# Patient Record
Sex: MALE | Race: WHITE | Employment: FULL TIME | URBAN - METROPOLITAN AREA
[De-identification: names, ages, dates, MRNs, and addresses within clinical notes are randomized per-mention and may not be internally consistent; named-entity substitution may affect disease eponyms.]

---

## 2018-07-11 ENCOUNTER — CLINICAL DOCUMENTATION (OUTPATIENT)
Dept: CARDIOLOGY | Age: 48
End: 2018-07-11

## 2018-07-11 PROBLEM — I25.9 CHEST PAIN DUE TO MYOCARDIAL ISCHEMIA: Status: ACTIVE | Noted: 2018-07-11

## 2018-07-12 PROBLEM — R07.89 ATYPICAL CHEST PAIN: Status: ACTIVE | Noted: 2018-07-11

## 2018-12-12 ENCOUNTER — OFFICE VISIT (OUTPATIENT)
Dept: CARDIOLOGY CLINIC | Age: 48
End: 2018-12-12
Payer: COMMERCIAL

## 2018-12-12 ENCOUNTER — ANESTHESIA EVENT (OUTPATIENT)
Dept: CARDIAC CATH/INVASIVE PROCEDURES | Age: 48
End: 2018-12-12
Payer: COMMERCIAL

## 2018-12-12 VITALS
HEART RATE: 120 BPM | WEIGHT: 225 LBS | BODY MASS INDEX: 32.21 KG/M2 | DIASTOLIC BLOOD PRESSURE: 76 MMHG | HEIGHT: 70 IN | SYSTOLIC BLOOD PRESSURE: 96 MMHG

## 2018-12-12 DIAGNOSIS — I47.1 ATRIAL TACHYCARDIA (HCC): Primary | ICD-10-CM

## 2018-12-12 PROCEDURE — 4004F PT TOBACCO SCREEN RCVD TLK: CPT | Performed by: INTERNAL MEDICINE

## 2018-12-12 PROCEDURE — 99214 OFFICE O/P EST MOD 30 MIN: CPT | Performed by: INTERNAL MEDICINE

## 2018-12-12 PROCEDURE — G8427 DOCREV CUR MEDS BY ELIG CLIN: HCPCS | Performed by: INTERNAL MEDICINE

## 2018-12-12 PROCEDURE — 3017F COLORECTAL CA SCREEN DOC REV: CPT | Performed by: INTERNAL MEDICINE

## 2018-12-12 PROCEDURE — G8417 CALC BMI ABV UP PARAM F/U: HCPCS | Performed by: INTERNAL MEDICINE

## 2018-12-12 PROCEDURE — G8484 FLU IMMUNIZE NO ADMIN: HCPCS | Performed by: INTERNAL MEDICINE

## 2018-12-12 RX ORDER — METOPROLOL SUCCINATE 25 MG/1
25 TABLET, EXTENDED RELEASE ORAL DAILY
Qty: 30 TABLET | Refills: 3 | Status: SHIPPED | OUTPATIENT
Start: 2018-12-12 | End: 2019-01-09

## 2018-12-12 NOTE — LETTER
called an IV. But you may get medicines to take by mouth. You may feel a quick sting or pinch when the IV starts. The procedure usually takes about 4 to 8 hours. Transesophageal Echocardiogram  What is a transesophageal echocardiogram?  A transesophageal echocardiogram is a test to help your doctor look at the inside of your heart. A small device called a transducer directs sound waves toward your heart. The sound waves make a picture of the heart's valves and chambers. Your doctor may do this test to look for certain types of heart disease. Or it may be done to see how disease is affecting your heart. You will be given medicine to make you sleepy and comfortable during the test. The doctor puts a small, flexible tube into your throat and guides it to the esophagus. This is the tube that connects your mouth to your stomach. The doctor will ask you to swallow as the tube goes down. The transducer is at the tip of the tube. It gets close to your heart to make clear pictures. The doctor will look at the ultrasound pictures on a screen. You will not be able to eat or drink until the numbness from the throat spray wears off. Your throat may be sore for a few days after the test.  Follow-up care is a key part of your treatment and safety. Be sure to make and go to all appointments, and call your doctor if you are having problems. It's also a good idea to know your test results and keep a list of the medicines you take. [address]/CARDIOLOGY        Patient Name: Derick Rivera   : 1970   MRN# [id number]    Transesophageal Echocardiogram with Cardioversion    Day of Procedure Cath Lab Holding Area Preop Orders:    ? YOU HAVE MY PERMISSION TO TALK TO THE PATIENT-PLEASE    ? Anesthesia    ? CARMENZA with Anesthesia    ? IV peripheral saline lock  (preferred left arm).     ? STAT LABS ON ARRIVAL: BMP, MAG, PHOS, CBC, PT INR, PTT and/or other life saving measures, if I have a cardiac or respiratory arrest.    ___ I WANT to keep my DNR in effect during my procedure(s) and immediate post-operative recovery period through Phase 2 recovery. (Complete separate refusal form)     This form has been fully explained to me. I understand its contents. Patients Signature: ___________________________Date: ________  Time: ________    If patient unable to sign, has engaged the [address] [place] [address], is a minor, or has a court-appointed Guardian:  [facility] Representative Name (Print):  ____________________________________      Relationship ([place] one):    Guardian   Parent    Spouse    HCPOA   Child   Sibling  Next-of-Kin Friend    Patients Representative Signature: _______________________________________              Date: ______________  Time: __________    An  was used.  name/ID: _________________________________      [facility] ([facility]) Witness________________________  Date: ________   Time: _________    Physician/Practitioner _______________________  Date: ________   Time: _________           Revision 2017                                      Geovanna Pickering Alt     PROCEDURE TO SCHEDULE:    Transesophageal Echocardiogram with Cardioversion    Patient Name: Blake Tucker   : 1970   MRN# [id number]    Home Phone Number: [phone]   Weight:    Wt Readings from Last 3 Encounters:   18 225 lb (102.1 kg)   18 223 lb 6.4 oz (101.3 kg)   16 220 lb (99.8 kg)        Insurance: Payor: / No coverage found. Date of Procedure: 2018 Time: 11:30 am Arrival Time: 9:30 am    Diagnosis:  Atrial Flutter  Allergies:    Allergies   Allergen Reactions    Codeine Nausea Only    Pcn [Penicillins] Rash    Sulfa Antibiotics Rash          1) Call [facility] scheduling ([phone]) or [address]     PHONE OR   INSTANT MESSAGE

## 2018-12-12 NOTE — ANESTHESIA PRE PROCEDURE
Department of Anesthesiology  Preprocedure Note       Name:  Luis Taveras   Age:  46 y.o.  :  1970                                          MRN:  [id number]         Date:  2018      Surgeon: * Surgery not found *    Procedure: CL CARDIOVERSION    Medications prior to admission:   Prior to Admission medications    Medication Sig Start Date End Date Taking? Authorizing Provider   Multiple Vitamins-Minerals (MULTIVITAMIN ADULT PO) Take by mouth    Historical Provider, MD   metoprolol succinate (TOPROL XL) 25 MG extended release tablet Take 1 tablet by mouth daily 18   Keenan Schlatter, MD   rivaroxaban (XARELTO) 20 MG TABS tablet Take 1 tablet by mouth daily (with breakfast) 18   Fred Carlos Montanez MD       Current medications:    Current Outpatient Prescriptions   Medication Sig Dispense Refill    Multiple Vitamins-Minerals (MULTIVITAMIN ADULT PO) Take by mouth      metoprolol succinate (TOPROL XL) 25 MG extended release tablet Take 1 tablet by mouth daily 30 tablet 3    rivaroxaban (XARELTO) 20 MG TABS tablet Take 1 tablet by mouth daily (with breakfast) 30 tablet 2     No current facility-administered medications for this encounter. Allergies:     Allergies   Allergen Reactions    Codeine Nausea Only    Pcn [Penicillins] Rash    Sulfa Antibiotics Rash       Problem List:    Patient Active Problem List   Diagnosis Code    Atrial fibrillation with rapid ventricular response (HCC) I48.91    Chest discomfort R07.89    Chronic tension headaches G44.229    Nausea & vomiting R11.2    LLQ abdominal pain R10.32    Diarrhea R19.7    Atrial fibrillation (HCC) I48.91    S/P ablation of atrial fibrillation Z98.890, Z86.79    S/P ablation of atrial flutter Z98.890, Z86.79    Atypical chest pain R07.89       Past Medical History:        Diagnosis Date    Arrhythmia 2014    Atrial fib    Arthritis     right shoulder    H/O cardiovascular stress test 2014

## 2018-12-12 NOTE — PROGRESS NOTES
CARDIOLOGY NOTE      12/12/2018    RE: Vitaly Fields  (1970)                               TO:  Dr. Nicolette Tolliver DO            Melania Msoer is a 46 y.o. male who was seen today for management of  tachycardia                                    HPI:   Patient is here for    - arrythmia                The patient {HAS/DOES NOT HAVE:20504} cardiac complaints  Was at PCP was noted to be tachy with ? ST changes    Vitaly Fields has the following history recorded in care path:  Patient Active Problem List    Diagnosis Date Noted    Atrial fibrillation with rapid ventricular response ([facility] [address] 11/14/2014     Priority: High    Atypical chest pain 07/11/2018     Priority: Low    S/P ablation of atrial fibrillation      Priority: Low    S/P ablation of atrial flutter      Priority: Low    Atrial fibrillation (HCC)      Priority: Low    Chest discomfort 11/14/2014     Priority: Low    Chronic tension headaches 11/14/2014     Priority: Low    Nausea & vomiting 11/14/2014     Priority: Low    LLQ abdominal pain 11/14/2014     Priority: Low    Diarrhea 11/14/2014     Priority: Low     Current Outpatient Prescriptions   Medication Sig Dispense Refill    pantoprazole (PROTONIX) 40 MG tablet Take 1 tablet by mouth every morning (before breakfast) for 14 days 14 tablet 0    nicotine (NICODERM CQ) 14 MG/24HR Place 1 patch onto the skin daily 30 patch 0    melatonin 3 MG TABS tablet Take 3 mg by mouth daily as needed       No current facility-administered medications for this visit. Allergies: Codeine; Pcn [penicillins]; and Sulfa antibiotics  Past Medical History:   Diagnosis Date    Arrhythmia 11/2014    Atrial fib    Arthritis     right shoulder    H/O cardiovascular stress test 12/12/2014    cardiolite-normal, no ischemia    H/O echocardiogram 4/13/15    EF 50-55% Mildly dilated left atrium. Borderline sized right ventricle. Trace MR & TR.      Past Surgical History:   Procedure

## 2018-12-13 ENCOUNTER — HOSPITAL ENCOUNTER (OUTPATIENT)
Dept: CARDIAC CATH/INVASIVE PROCEDURES | Age: 48
Discharge: HOME | End: 2018-12-13
Attending: INTERNAL MEDICINE | Admitting: INTERNAL MEDICINE
Payer: COMMERCIAL

## 2018-12-13 ENCOUNTER — ANESTHESIA (OUTPATIENT)
Dept: CARDIAC CATH/INVASIVE PROCEDURES | Age: 48
End: 2018-12-13
Payer: COMMERCIAL

## 2018-12-13 VITALS
SYSTOLIC BLOOD PRESSURE: 104 MMHG | TEMPERATURE: 97.8 F | HEART RATE: 77 BPM | WEIGHT: 225 LBS | BODY MASS INDEX: 32.21 KG/M2 | RESPIRATION RATE: 20 BRPM | DIASTOLIC BLOOD PRESSURE: 78 MMHG | OXYGEN SATURATION: 99 % | HEIGHT: 70 IN

## 2018-12-13 VITALS — DIASTOLIC BLOOD PRESSURE: 81 MMHG | SYSTOLIC BLOOD PRESSURE: 120 MMHG | OXYGEN SATURATION: 99 %

## 2018-12-13 LAB
ANION GAP SERPL CALCULATED.3IONS-SCNC: 10 MMOL/L (ref 4–16)
APTT: 44.9 SECONDS (ref 21.2–33)
BUN BLDV-MCNC: 18 MG/DL (ref 6–23)
CALCIUM SERPL-MCNC: 9.2 MG/DL (ref 8.3–10.6)
CHLORIDE BLD-SCNC: 105 MMOL/L (ref 99–110)
CO2: 25 MMOL/L (ref 21–32)
CREAT SERPL-MCNC: 0.8 MG/DL (ref 0.9–1.3)
EKG ATRIAL RATE: 256 BPM
EKG ATRIAL RATE: 76 BPM
EKG DIAGNOSIS: NORMAL
EKG DIAGNOSIS: NORMAL
EKG P AXIS: 62 DEGREES
EKG P-R INTERVAL: 172 MS
EKG Q-T INTERVAL: 340 MS
EKG Q-T INTERVAL: 388 MS
EKG QRS DURATION: 96 MS
EKG QRS DURATION: 98 MS
EKG QTC CALCULATION (BAZETT): 436 MS
EKG QTC CALCULATION (BAZETT): 476 MS
EKG R AXIS: -16 DEGREES
EKG R AXIS: -30 DEGREES
EKG T AXIS: 0 DEGREES
EKG T AXIS: 4 DEGREES
EKG VENTRICULAR RATE: 118 BPM
EKG VENTRICULAR RATE: 76 BPM
GFR AFRICAN AMERICAN: >60 ML/MIN/1.73M2
GFR NON-AFRICAN AMERICAN: >60 ML/MIN/1.73M2
GLUCOSE BLD-MCNC: 113 MG/DL (ref 70–99)
HCT VFR BLD CALC: 47.7 % (ref 42–52)
HEMOGLOBIN: 15.9 GM/DL (ref 13.5–18)
INR BLD: 1.78 INDEX
MAGNESIUM: 1.8 MG/DL (ref 1.8–2.4)
MCH RBC QN AUTO: 29.9 PG (ref 27–31)
MCHC RBC AUTO-ENTMCNC: 33.3 % (ref 32–36)
MCV RBC AUTO: 89.7 FL (ref 78–100)
PDW BLD-RTO: 12 % (ref 11.7–14.9)
PHOSPHORUS: 2.9 MG/DL (ref 2.5–4.9)
PLATELET # BLD: 254 K/CU MM (ref 140–440)
PMV BLD AUTO: 9.5 FL (ref 7.5–11.1)
POTASSIUM SERPL-SCNC: 5 MMOL/L (ref 3.5–5.1)
PROTHROMBIN TIME: 20.5 SECONDS (ref 9.12–12.5)
RBC # BLD: 5.32 M/CU MM (ref 4.6–6.2)
SODIUM BLD-SCNC: 140 MMOL/L (ref 135–145)
WBC # BLD: 6.7 K/CU MM (ref 4–10.5)

## 2018-12-13 PROCEDURE — 93005 ELECTROCARDIOGRAM TRACING: CPT

## 2018-12-13 PROCEDURE — 3700000001 HC ADD 15 MINUTES (ANESTHESIA)

## 2018-12-13 PROCEDURE — 93325 DOPPLER ECHO COLOR FLOW MAPG: CPT | Performed by: INTERNAL MEDICINE

## 2018-12-13 PROCEDURE — 83735 ASSAY OF MAGNESIUM: CPT

## 2018-12-13 PROCEDURE — 84100 ASSAY OF PHOSPHORUS: CPT

## 2018-12-13 PROCEDURE — 2500000003 HC RX 250 WO HCPCS

## 2018-12-13 PROCEDURE — 3700000000 HC ANESTHESIA ATTENDED CARE

## 2018-12-13 PROCEDURE — 93312 ECHO TRANSESOPHAGEAL: CPT | Performed by: INTERNAL MEDICINE

## 2018-12-13 PROCEDURE — 93312 ECHO TRANSESOPHAGEAL: CPT

## 2018-12-13 PROCEDURE — 85610 PROTHROMBIN TIME: CPT

## 2018-12-13 PROCEDURE — 6360000002 HC RX W HCPCS: Performed by: NURSE ANESTHETIST, CERTIFIED REGISTERED

## 2018-12-13 PROCEDURE — 92960 CARDIOVERSION ELECTRIC EXT: CPT

## 2018-12-13 PROCEDURE — 2580000003 HC RX 258: Performed by: NURSE ANESTHETIST, CERTIFIED REGISTERED

## 2018-12-13 PROCEDURE — 6370000000 HC RX 637 (ALT 250 FOR IP): Performed by: INTERNAL MEDICINE

## 2018-12-13 PROCEDURE — 6360000002 HC RX W HCPCS

## 2018-12-13 PROCEDURE — 2709999900 HC NON-CHARGEABLE SUPPLY

## 2018-12-13 PROCEDURE — 2500000003 HC RX 250 WO HCPCS: Performed by: NURSE ANESTHETIST, CERTIFIED REGISTERED

## 2018-12-13 PROCEDURE — 85730 THROMBOPLASTIN TIME PARTIAL: CPT

## 2018-12-13 PROCEDURE — 85027 COMPLETE CBC AUTOMATED: CPT

## 2018-12-13 PROCEDURE — 80048 BASIC METABOLIC PNL TOTAL CA: CPT

## 2018-12-13 PROCEDURE — 92960 CARDIOVERSION ELECTRIC EXT: CPT | Performed by: INTERNAL MEDICINE

## 2018-12-13 RX ORDER — SODIUM CHLORIDE 9 MG/ML
INJECTION, SOLUTION INTRAVENOUS CONTINUOUS PRN
Status: DISCONTINUED | OUTPATIENT
Start: 2018-12-13 | End: 2018-12-13 | Stop reason: SDUPTHER

## 2018-12-13 RX ORDER — PROPOFOL 10 MG/ML
INJECTION, EMULSION INTRAVENOUS PRN
Status: DISCONTINUED | OUTPATIENT
Start: 2018-12-13 | End: 2018-12-13 | Stop reason: SDUPTHER

## 2018-12-13 RX ORDER — LIDOCAINE HYDROCHLORIDE 20 MG/ML
INJECTION, SOLUTION EPIDURAL; INFILTRATION; INTRACAUDAL; PERINEURAL PRN
Status: DISCONTINUED | OUTPATIENT
Start: 2018-12-13 | End: 2018-12-13 | Stop reason: SDUPTHER

## 2018-12-13 RX ADMIN — METOPROLOL TARTRATE 25 MG: 25 TABLET, FILM COATED ORAL at 12:45

## 2018-12-13 RX ADMIN — LIDOCAINE HYDROCHLORIDE 100 MG: 20 INJECTION, SOLUTION EPIDURAL; INFILTRATION; INTRACAUDAL; PERINEURAL at 11:49

## 2018-12-13 RX ADMIN — SODIUM CHLORIDE: 9 INJECTION, SOLUTION INTRAVENOUS at 11:19

## 2018-12-13 RX ADMIN — PROPOFOL 100 MG: 10 INJECTION, EMULSION INTRAVENOUS at 11:49

## 2018-12-13 ASSESSMENT — PULMONARY FUNCTION TESTS
PIF_VALUE: 1

## 2018-12-14 ENCOUNTER — TELEPHONE (OUTPATIENT)
Dept: CARDIOLOGY CLINIC | Age: 48
End: 2018-12-14

## 2018-12-17 ENCOUNTER — TELEPHONE (OUTPATIENT)
Dept: CARDIOLOGY CLINIC | Age: 48
End: 2018-12-17

## 2018-12-17 NOTE — LETTER
[facility]  [address] [place] [address]  Phone: [phone]  Fax: [phone]    Janelle Landry MD        December 17, 2018     Patient: Chris Hall   YOB: 1970   Date of Visit: 12/17/2018       To Whom It May Concern: It is my medical opinion that Yasmine Rosas may return to work on Monday, December 17, 2018. 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 We will re elevate at next appointment on December 26, 2018 @ 1:20 pm.    If you have any questions or concerns, please don't hesitate to call.     Sincerely,      Janelle Landry MD

## 2018-12-26 ENCOUNTER — OFFICE VISIT (OUTPATIENT)
Dept: CARDIOLOGY CLINIC | Age: 48
End: 2018-12-26
Payer: COMMERCIAL

## 2018-12-26 ENCOUNTER — HOSPITAL ENCOUNTER (OUTPATIENT)
Age: 48
Discharge: HOME | End: 2018-12-26
Payer: COMMERCIAL

## 2018-12-26 VITALS
DIASTOLIC BLOOD PRESSURE: 77 MMHG | SYSTOLIC BLOOD PRESSURE: 111 MMHG | HEART RATE: 88 BPM | RESPIRATION RATE: 16 BRPM | OXYGEN SATURATION: 100 %

## 2018-12-26 VITALS
HEIGHT: 70 IN | HEART RATE: 130 BPM | BODY MASS INDEX: 31.55 KG/M2 | WEIGHT: 220.4 LBS | SYSTOLIC BLOOD PRESSURE: 112 MMHG | DIASTOLIC BLOOD PRESSURE: 82 MMHG

## 2018-12-26 DIAGNOSIS — Z98.890 S/P ABLATION OF ATRIAL FLUTTER: ICD-10-CM

## 2018-12-26 DIAGNOSIS — I48.92 ATRIAL FLUTTER, UNSPECIFIED TYPE (HCC): ICD-10-CM

## 2018-12-26 DIAGNOSIS — Z86.79 S/P ABLATION OF ATRIAL FIBRILLATION: ICD-10-CM

## 2018-12-26 DIAGNOSIS — R07.9 CHEST PAIN, UNSPECIFIED TYPE: Primary | ICD-10-CM

## 2018-12-26 DIAGNOSIS — Z98.890 S/P ABLATION OF ATRIAL FIBRILLATION: ICD-10-CM

## 2018-12-26 DIAGNOSIS — Z86.79 S/P ABLATION OF ATRIAL FLUTTER: ICD-10-CM

## 2018-12-26 DIAGNOSIS — I48.91 ATRIAL FIBRILLATION WITH RAPID VENTRICULAR RESPONSE (HCC): ICD-10-CM

## 2018-12-26 LAB
EKG ATRIAL RATE: 80 BPM
EKG DIAGNOSIS: NORMAL
EKG P AXIS: 53 DEGREES
EKG P-R INTERVAL: 166 MS
EKG Q-T INTERVAL: 378 MS
EKG QRS DURATION: 90 MS
EKG QTC CALCULATION (BAZETT): 435 MS
EKG R AXIS: -42 DEGREES
EKG T AXIS: 1 DEGREES
EKG VENTRICULAR RATE: 80 BPM

## 2018-12-26 PROCEDURE — 93000 ELECTROCARDIOGRAM COMPLETE: CPT | Performed by: NURSE PRACTITIONER

## 2018-12-26 PROCEDURE — 92960 CARDIOVERSION ELECTRIC EXT: CPT | Performed by: INTERNAL MEDICINE

## 2018-12-26 PROCEDURE — 7100000000 HC PACU RECOVERY - FIRST 15 MIN

## 2018-12-26 PROCEDURE — 99213 OFFICE O/P EST LOW 20 MIN: CPT | Performed by: NURSE PRACTITIONER

## 2018-12-26 PROCEDURE — 7100000001 HC PACU RECOVERY - ADDTL 15 MIN

## 2018-12-26 PROCEDURE — 92960 CARDIOVERSION ELECTRIC EXT: CPT

## 2018-12-26 RX ORDER — AMIODARONE HYDROCHLORIDE 200 MG/1
200 TABLET ORAL 2 TIMES DAILY
Qty: 30 TABLET | Refills: 3 | Status: SHIPPED | OUTPATIENT
Start: 2018-12-26 | End: 2019-01-09 | Stop reason: SDUPTHER

## 2018-12-26 ASSESSMENT — ENCOUNTER SYMPTOMS
COUGH: 1
SORE THROAT: 0
EYES NEGATIVE: 1
GASTROINTESTINAL NEGATIVE: 1
ALLERGIC/IMMUNOLOGIC NEGATIVE: 1

## 2018-12-26 NOTE — LETTER
Radha Foster     Cardioversion    Patient Name: Guero Olivera   : 1970   MRN# [id number]     Date of Procedure: 18 Time: 3pm Arrival Time: 130pm     Hospital: [facility])     Call to [facility] at [phone] 2 days before your procedure    X Please do not have anything by mouth after midnight prior to or 8 hours before the procedure. X You may take your medication with a sip of water unless advised otherwise below. X Please continue to take Xarelto (rivaroxaban) as directed. Patient Signature:____________________________ Staff Signature: Maylin Payne  What is cardioversion? Cardioversion helps your heart return to a normal rhythm. It treats problems like atrial fibrillation. It is also sometimes used in emergencies. It can correct a fast heartbeat that causes low blood pressure, chest pain, or heart failure. Your doctor may ask you to take medicines before the treatment. These help prevent blood clots. Your doctor will watch you closely to make sure that there are no problems. Electrical cardioversion: The electrical procedure is done in a hospital. You will get medicine to help you relax and control the pain. Your doctor will put paddles or patches on your chest and back. These send an electric current to your heart. This resets your heart rhythm. The electrical part takes about 5 minutes. But you will probably be in the hospital for 1 to 2 hours. You will need to recover from the effects of the pain medicine. Chemical cardioversion: The chemical procedure is most often done in a hospital. In most cases, the medicine is put into your arm through a tube called an IV. But you may get medicines to take by mouth. You may feel a quick sting or pinch when the IV starts.  The procedure usually takes about An  was used.  name/ID: _________________________________      [facility] ([facility]) Witness________________________  Date: ________   Time: _________    Physician/Practitioner _______________________  Date: ________   Time: _________           Revision 2017        Geovanna Owens       PROCEDURE TO SCHEDULE:    Cardioversion    Patient Name: Blake Tucker   : 1970   MRN# [id number]    Home Phone Number: [phone]   Weight:    Wt Readings from Last 3 Encounters:   18 220 lb 6.4 oz (100 kg)   18 225 lb (102.1 kg)   18 225 lb (102.1 kg)        Insurance: Payor: Miracle Harmon / Plan: Suzanne Wall  / Product Type: *No Product type* /     Date of Procedure: 18 Time: 3pm Arrival Time: 130pm    Diagnosis:  Atrial flutter   Allergies:    Allergies   Allergen Reactions    Codeine Nausea Only    Pcn [Penicillins] Rash    Sulfa Antibiotics Rash          1) Call [facility] scheduling ([phone]) or Instant Message  CONFIRMED WITH     PHONE OR   INSTANT MESSAGE  2) PREAUTHORIZATION NUMBER:    Spoke to:      From date:     expiration date:          Javed Elliott

## 2018-12-26 NOTE — PROGRESS NOTES
Electrophysiology Follow up      Chief complaint :  F/o cardioversion of atrial flutter    Referring physician: Dr. Elena Bangura     Primary care physician: Jameel Blanchard. DO Michelle     History of Present Illness: 12/26/2018  He notes he stopped taking his b blocker on Monday. [place] it made him hot and had chest pain. He notes he is having palpitations as before his cardioversion. He notes that his breath is taken away when he lays down and changes from side to side. Note he is having episodes of feeling hot and cold flashes. There is some chest wall tenderness to palpation. Previous visit on (12/12/2018)           Chief Complaint   Patient presents with    Chest Pain        patient sent here from PCP visit this morning. Pt c/o chest pains, radiating into neck, shoulder and hip pain. Sob with activity, palpitations, migraines, dizziness and cold sweats. Episodes have happened at work while standing on his machine. Symptoms are getting worse after experiencing them for the past few months. Pt denies edema.  Shortness of Breath    Palpitations    Dizziness          Previous visit:2/10/2016)           Chief Complaint   Patient presents with    6 Month Follow-Up       Patient is here for 6 month OV, he denies any chest pain, palpitations, dizziness, SOB or edema. Pt wants to discuss some of his medications with you.       Over all he feels lot better  He reports arthralgia at times - question of medication induced  Metoprolol makes him tired for some time after he takes it.        Previous visit: (7/10/2015)             Chief Complaint   Patient presents with    Check-Up       2 month follow up. Denies dizziness, edema, and palpitations. He hasn't felt like he has been in A-Fib since having the ablation.  Had great energies and was able to work with out any problem till he has come down with this URI        Chest Congestion       Patient states that for the last 3 weeks he has been feeling a

## 2018-12-27 RX ORDER — MIDAZOLAM HYDROCHLORIDE 1 MG/ML
2 INJECTION INTRAMUSCULAR; INTRAVENOUS ONCE
Status: ACTIVE | OUTPATIENT
Start: 2018-12-26

## 2018-12-28 ENCOUNTER — TELEPHONE (OUTPATIENT)
Dept: CARDIOLOGY CLINIC | Age: 48
End: 2018-12-28

## 2018-12-28 NOTE — TELEPHONE ENCOUNTER
Patient here in office and educated on A flutter ablation , schedule for 1/15/19 @ 7:45, with arrival @ 5:45, @ [address]; risk explained; and consents signed. Also copy of orders given for labs and CXR due 1/11/19 at [facility]. Instruction given to patient to :  NPO after midnight the night before procedure; call hospital at [phone] to pre-register. May take rest of morning meds of procedure. Hold Xarelto the morning before procedure. Hold ALL blood pressure medications morning of procedure. Patient voiced understanding. Copies of consent & info scanned in chart.

## 2019-01-09 ENCOUNTER — OFFICE VISIT (OUTPATIENT)
Dept: CARDIOLOGY CLINIC | Age: 49
End: 2019-01-09
Payer: COMMERCIAL

## 2019-01-09 ENCOUNTER — TELEPHONE (OUTPATIENT)
Dept: CARDIOLOGY CLINIC | Age: 49
End: 2019-01-09

## 2019-01-09 VITALS
DIASTOLIC BLOOD PRESSURE: 98 MMHG | SYSTOLIC BLOOD PRESSURE: 130 MMHG | HEART RATE: 112 BPM | WEIGHT: 225.8 LBS | BODY MASS INDEX: 32.33 KG/M2 | HEIGHT: 70 IN

## 2019-01-09 DIAGNOSIS — I47.1 ATRIAL TACHYCARDIA (HCC): Primary | ICD-10-CM

## 2019-01-09 DIAGNOSIS — I48.91 ATRIAL FIBRILLATION, UNSPECIFIED TYPE (HCC): ICD-10-CM

## 2019-01-09 PROCEDURE — G8484 FLU IMMUNIZE NO ADMIN: HCPCS | Performed by: INTERNAL MEDICINE

## 2019-01-09 PROCEDURE — G8427 DOCREV CUR MEDS BY ELIG CLIN: HCPCS | Performed by: INTERNAL MEDICINE

## 2019-01-09 PROCEDURE — 3017F COLORECTAL CA SCREEN DOC REV: CPT | Performed by: INTERNAL MEDICINE

## 2019-01-09 PROCEDURE — 4004F PT TOBACCO SCREEN RCVD TLK: CPT | Performed by: INTERNAL MEDICINE

## 2019-01-09 PROCEDURE — 99213 OFFICE O/P EST LOW 20 MIN: CPT | Performed by: INTERNAL MEDICINE

## 2019-01-09 PROCEDURE — 93000 ELECTROCARDIOGRAM COMPLETE: CPT | Performed by: INTERNAL MEDICINE

## 2019-01-09 PROCEDURE — G8417 CALC BMI ABV UP PARAM F/U: HCPCS | Performed by: INTERNAL MEDICINE

## 2019-01-11 RX ORDER — AMIODARONE HYDROCHLORIDE 200 MG/1
200 TABLET ORAL DAILY
Qty: 30 TABLET | Refills: 3 | Status: SHIPPED | OUTPATIENT
Start: 2019-01-11 | End: 2019-05-22 | Stop reason: ALTCHOICE

## 2019-01-14 ENCOUNTER — HOSPITAL ENCOUNTER (OUTPATIENT)
Age: 49
Discharge: HOME | End: 2019-01-14
Payer: COMMERCIAL

## 2019-01-14 ENCOUNTER — HOSPITAL ENCOUNTER (OUTPATIENT)
Dept: GENERAL RADIOLOGY | Age: 49
Discharge: HOME | End: 2019-01-14
Payer: COMMERCIAL

## 2019-01-14 ENCOUNTER — ANESTHESIA EVENT (OUTPATIENT)
Dept: CARDIAC CATH/INVASIVE PROCEDURES | Age: 49
End: 2019-01-14
Payer: COMMERCIAL

## 2019-01-14 DIAGNOSIS — Z01.818 PRE-PROCEDURAL EXAMINATION: ICD-10-CM

## 2019-01-14 LAB
ANION GAP SERPL CALCULATED.3IONS-SCNC: 11 MMOL/L (ref 4–16)
APTT: 50.8 SECONDS (ref 21.2–33)
BUN BLDV-MCNC: 13 MG/DL (ref 6–23)
CALCIUM SERPL-MCNC: 9.1 MG/DL (ref 8.3–10.6)
CHLORIDE BLD-SCNC: 105 MMOL/L (ref 99–110)
CO2: 27 MMOL/L (ref 21–32)
CREAT SERPL-MCNC: 1.1 MG/DL (ref 0.9–1.3)
GFR AFRICAN AMERICAN: >60 ML/MIN/1.73M2
GFR NON-AFRICAN AMERICAN: >60 ML/MIN/1.73M2
GLUCOSE BLD-MCNC: 80 MG/DL (ref 70–99)
HCT VFR BLD CALC: 48.9 % (ref 42–52)
HEMOGLOBIN: 15.8 GM/DL (ref 13.5–18)
INR BLD: 1.76 INDEX
MAGNESIUM: 2.1 MG/DL (ref 1.8–2.4)
MCH RBC QN AUTO: 29.6 PG (ref 27–31)
MCHC RBC AUTO-ENTMCNC: 32.3 % (ref 32–36)
MCV RBC AUTO: 91.7 FL (ref 78–100)
PDW BLD-RTO: 12 % (ref 11.7–14.9)
PHOSPHORUS: 2.7 MG/DL (ref 2.5–4.9)
PLATELET # BLD: 291 K/CU MM (ref 140–440)
PMV BLD AUTO: 9.3 FL (ref 7.5–11.1)
POTASSIUM SERPL-SCNC: 4.8 MMOL/L (ref 3.5–5.1)
PROTHROMBIN TIME: 19.9 SECONDS (ref 9.12–12.5)
RBC # BLD: 5.33 M/CU MM (ref 4.6–6.2)
SODIUM BLD-SCNC: 143 MMOL/L (ref 135–145)
WBC # BLD: 7.1 K/CU MM (ref 4–10.5)

## 2019-01-14 PROCEDURE — 71046 X-RAY EXAM CHEST 2 VIEWS: CPT

## 2019-01-14 PROCEDURE — 36415 COLL VENOUS BLD VENIPUNCTURE: CPT

## 2019-01-14 PROCEDURE — 80048 BASIC METABOLIC PNL TOTAL CA: CPT

## 2019-01-14 PROCEDURE — 85730 THROMBOPLASTIN TIME PARTIAL: CPT

## 2019-01-14 PROCEDURE — 85610 PROTHROMBIN TIME: CPT

## 2019-01-14 PROCEDURE — 85027 COMPLETE CBC AUTOMATED: CPT

## 2019-01-14 PROCEDURE — 83735 ASSAY OF MAGNESIUM: CPT

## 2019-01-14 PROCEDURE — 84100 ASSAY OF PHOSPHORUS: CPT

## 2019-01-15 ENCOUNTER — APPOINTMENT (OUTPATIENT)
Dept: GENERAL RADIOLOGY | Age: 49
End: 2019-01-15
Attending: INTERNAL MEDICINE
Payer: COMMERCIAL

## 2019-01-15 ENCOUNTER — ANESTHESIA (OUTPATIENT)
Dept: CARDIAC CATH/INVASIVE PROCEDURES | Age: 49
End: 2019-01-15
Payer: COMMERCIAL

## 2019-01-15 ENCOUNTER — HOSPITAL ENCOUNTER (OUTPATIENT)
Dept: CARDIAC CATH/INVASIVE PROCEDURES | Age: 49
Setting detail: OBSERVATION
Discharge: HOME | End: 2019-01-16
Attending: INTERNAL MEDICINE | Admitting: INTERNAL MEDICINE
Payer: COMMERCIAL

## 2019-01-15 VITALS
DIASTOLIC BLOOD PRESSURE: 57 MMHG | TEMPERATURE: 97.6 F | OXYGEN SATURATION: 100 % | RESPIRATION RATE: 6 BRPM | SYSTOLIC BLOOD PRESSURE: 93 MMHG

## 2019-01-15 LAB
ACTIVATED CLOTTING TIME, LOW RANGE: 154 SEC
ACTIVATED CLOTTING TIME, LOW RANGE: 181 SEC
ACTIVATED CLOTTING TIME, LOW RANGE: 217 SEC
ACTIVATED CLOTTING TIME, LOW RANGE: 306 SEC
ACTIVATED CLOTTING TIME, LOW RANGE: 321 SEC
ACTIVATED CLOTTING TIME, LOW RANGE: 332 SEC
ACTIVATED CLOTTING TIME, LOW RANGE: 341 SEC
ACTIVATED CLOTTING TIME, LOW RANGE: 360 SEC
BASE EXCESS MIXED: 0.9 (ref 0–1.2)
BASE EXCESS: ABNORMAL (ref 0–3.3)
CO2: 27 MMOL/L (ref 21–32)
GLUCOSE BLD-MCNC: 124 MG/DL (ref 70–99)
HCO3 ARTERIAL: 25.4 MMOL/L (ref 18–23)
HCT VFR BLD CALC: 44 % (ref 42–52)
HEMOGLOBIN: 15.1 GM/DL (ref 13.5–18)
O2 SATURATION: 99.9 % (ref 96–97)
PCO2 ARTERIAL: 39.6 MMHG (ref 32–45)
PH BLOOD: 7.42 (ref 7.34–7.45)
PO2 ARTERIAL: 255.8 MMHG (ref 75–100)
POC CALCIUM: 1.14 MMOL/L (ref 1.12–1.32)
POC CHLORIDE: 111 MMOL/L (ref 98–109)
POC CREATININE: 0.7 MG/DL (ref 0.9–1.3)
POTASSIUM SERPL-SCNC: 4.6 MMOL/L (ref 3.5–4.5)
SODIUM BLD-SCNC: 143 MMOL/L (ref 138–146)
SOURCE, BLOOD GAS: ABNORMAL

## 2019-01-15 PROCEDURE — 93655 ICAR CATH ABLTJ DSCRT ARRHYT: CPT | Performed by: INTERNAL MEDICINE

## 2019-01-15 PROCEDURE — 93662 INTRACARDIAC ECG (ICE): CPT

## 2019-01-15 PROCEDURE — 2700000000 HC OXYGEN THERAPY PER DAY

## 2019-01-15 PROCEDURE — 2500000003 HC RX 250 WO HCPCS

## 2019-01-15 PROCEDURE — 93656 COMPRE EP EVAL ABLTJ ATR FIB: CPT

## 2019-01-15 PROCEDURE — 94761 N-INVAS EAR/PLS OXIMETRY MLT: CPT

## 2019-01-15 PROCEDURE — 6360000002 HC RX W HCPCS

## 2019-01-15 PROCEDURE — C1733 CATH, EP, OTHR THAN COOL-TIP: HCPCS

## 2019-01-15 PROCEDURE — 93655 ICAR CATH ABLTJ DSCRT ARRHYT: CPT

## 2019-01-15 PROCEDURE — 93308 TTE F-UP OR LMTD: CPT

## 2019-01-15 PROCEDURE — C1753 CATH, INTRAVAS ULTRASOUND: HCPCS

## 2019-01-15 PROCEDURE — 2500000003 HC RX 250 WO HCPCS: Performed by: NURSE ANESTHETIST, CERTIFIED REGISTERED

## 2019-01-15 PROCEDURE — 86901 BLOOD TYPING SEROLOGIC RH(D): CPT

## 2019-01-15 PROCEDURE — 93325 DOPPLER ECHO COLOR FLOW MAPG: CPT | Performed by: INTERNAL MEDICINE

## 2019-01-15 PROCEDURE — C1894 INTRO/SHEATH, NON-LASER: HCPCS

## 2019-01-15 PROCEDURE — 85347 COAGULATION TIME ACTIVATED: CPT

## 2019-01-15 PROCEDURE — 71045 X-RAY EXAM CHEST 1 VIEW: CPT

## 2019-01-15 PROCEDURE — 93312 ECHO TRANSESOPHAGEAL: CPT

## 2019-01-15 PROCEDURE — 93312 ECHO TRANSESOPHAGEAL: CPT | Performed by: INTERNAL MEDICINE

## 2019-01-15 PROCEDURE — 93613 INTRACARDIAC EPHYS 3D MAPG: CPT

## 2019-01-15 PROCEDURE — 93621 COMP EP EVL L PAC&REC C SINS: CPT | Performed by: INTERNAL MEDICINE

## 2019-01-15 PROCEDURE — 96372 THER/PROPH/DIAG INJ SC/IM: CPT

## 2019-01-15 PROCEDURE — 7100000001 HC PACU RECOVERY - ADDTL 15 MIN

## 2019-01-15 PROCEDURE — 86850 RBC ANTIBODY SCREEN: CPT

## 2019-01-15 PROCEDURE — C1732 CATH, EP, DIAG/ABL, 3D/VECT: HCPCS

## 2019-01-15 PROCEDURE — 6360000002 HC RX W HCPCS: Performed by: NURSE ANESTHETIST, CERTIFIED REGISTERED

## 2019-01-15 PROCEDURE — 93005 ELECTROCARDIOGRAM TRACING: CPT

## 2019-01-15 PROCEDURE — 7100000000 HC PACU RECOVERY - FIRST 15 MIN

## 2019-01-15 PROCEDURE — 86900 BLOOD TYPING SEROLOGIC ABO: CPT

## 2019-01-15 PROCEDURE — 2580000003 HC RX 258

## 2019-01-15 PROCEDURE — 6360000002 HC RX W HCPCS: Performed by: INTERNAL MEDICINE

## 2019-01-15 PROCEDURE — 2709999900 HC NON-CHARGEABLE SUPPLY

## 2019-01-15 PROCEDURE — 3700000000 HC ANESTHESIA ATTENDED CARE

## 2019-01-15 PROCEDURE — 93462 L HRT CATH TRNSPTL PUNCTURE: CPT | Performed by: INTERNAL MEDICINE

## 2019-01-15 PROCEDURE — 6370000000 HC RX 637 (ALT 250 FOR IP): Performed by: INTERNAL MEDICINE

## 2019-01-15 PROCEDURE — 3700000001 HC ADD 15 MINUTES (ANESTHESIA)

## 2019-01-15 PROCEDURE — 93657 TX L/R ATRIAL FIB ADDL: CPT

## 2019-01-15 PROCEDURE — 2580000003 HC RX 258: Performed by: INTERNAL MEDICINE

## 2019-01-15 PROCEDURE — 2580000003 HC RX 258: Performed by: NURSE ANESTHETIST, CERTIFIED REGISTERED

## 2019-01-15 PROCEDURE — 93662 INTRACARDIAC ECG (ICE): CPT | Performed by: INTERNAL MEDICINE

## 2019-01-15 PROCEDURE — 93653 COMPRE EP EVAL TX SVT: CPT | Performed by: INTERNAL MEDICINE

## 2019-01-15 PROCEDURE — 93613 INTRACARDIAC EPHYS 3D MAPG: CPT | Performed by: INTERNAL MEDICINE

## 2019-01-15 PROCEDURE — C1730 CATH, EP, 19 OR FEW ELECT: HCPCS

## 2019-01-15 RX ORDER — FENTANYL CITRATE 50 UG/ML
25 INJECTION, SOLUTION INTRAMUSCULAR; INTRAVENOUS EVERY 5 MIN PRN
Status: DISCONTINUED | OUTPATIENT
Start: 2019-01-15 | End: 2019-01-15

## 2019-01-15 RX ORDER — SODIUM CHLORIDE 0.9 % (FLUSH) 0.9 %
10 SYRINGE (ML) INJECTION EVERY 12 HOURS SCHEDULED
Status: DISCONTINUED | OUTPATIENT
Start: 2019-01-15 | End: 2019-01-16 | Stop reason: HOSPADM

## 2019-01-15 RX ORDER — HYDROMORPHONE HCL 110MG/55ML
0.5 PATIENT CONTROLLED ANALGESIA SYRINGE INTRAVENOUS EVERY 5 MIN PRN
Status: DISCONTINUED | OUTPATIENT
Start: 2019-01-15 | End: 2019-01-15

## 2019-01-15 RX ORDER — AMIODARONE HYDROCHLORIDE 200 MG/1
200 TABLET ORAL DAILY
Status: DISCONTINUED | OUTPATIENT
Start: 2019-01-15 | End: 2019-01-16 | Stop reason: HOSPADM

## 2019-01-15 RX ORDER — LABETALOL HYDROCHLORIDE 5 MG/ML
5 INJECTION, SOLUTION INTRAVENOUS EVERY 10 MIN PRN
Status: DISCONTINUED | OUTPATIENT
Start: 2019-01-15 | End: 2019-01-15

## 2019-01-15 RX ORDER — PROPOFOL 10 MG/ML
INJECTION, EMULSION INTRAVENOUS PRN
Status: DISCONTINUED | OUTPATIENT
Start: 2019-01-15 | End: 2019-01-15 | Stop reason: SDUPTHER

## 2019-01-15 RX ORDER — ONDANSETRON 2 MG/ML
INJECTION INTRAMUSCULAR; INTRAVENOUS PRN
Status: DISCONTINUED | OUTPATIENT
Start: 2019-01-15 | End: 2019-01-15 | Stop reason: SDUPTHER

## 2019-01-15 RX ORDER — SODIUM CHLORIDE 0.9 % (FLUSH) 0.9 %
10 SYRINGE (ML) INJECTION PRN
Status: DISCONTINUED | OUTPATIENT
Start: 2019-01-15 | End: 2019-01-16 | Stop reason: HOSPADM

## 2019-01-15 RX ORDER — HYDRALAZINE HYDROCHLORIDE 20 MG/ML
5 INJECTION INTRAMUSCULAR; INTRAVENOUS EVERY 10 MIN PRN
Status: DISCONTINUED | OUTPATIENT
Start: 2019-01-15 | End: 2019-01-15

## 2019-01-15 RX ORDER — NICOTINE 21 MG/24HR
1 PATCH, TRANSDERMAL 24 HOURS TRANSDERMAL DAILY
Status: DISCONTINUED | OUTPATIENT
Start: 2019-01-15 | End: 2019-01-16 | Stop reason: HOSPADM

## 2019-01-15 RX ORDER — ACETAMINOPHEN 325 MG/1
650 TABLET ORAL EVERY 8 HOURS PRN
Status: DISCONTINUED | OUTPATIENT
Start: 2019-01-15 | End: 2019-01-16 | Stop reason: HOSPADM

## 2019-01-15 RX ORDER — ONDANSETRON 2 MG/ML
4 INJECTION INTRAMUSCULAR; INTRAVENOUS
Status: DISCONTINUED | OUTPATIENT
Start: 2019-01-15 | End: 2019-01-15

## 2019-01-15 RX ORDER — PANTOPRAZOLE SODIUM 40 MG/1
40 TABLET, DELAYED RELEASE ORAL
Status: DISCONTINUED | OUTPATIENT
Start: 2019-01-15 | End: 2019-01-16 | Stop reason: HOSPADM

## 2019-01-15 RX ORDER — FENTANYL CITRATE 50 UG/ML
INJECTION, SOLUTION INTRAMUSCULAR; INTRAVENOUS PRN
Status: DISCONTINUED | OUTPATIENT
Start: 2019-01-15 | End: 2019-01-15 | Stop reason: SDUPTHER

## 2019-01-15 RX ORDER — VECURONIUM BROMIDE 1 MG/ML
INJECTION, POWDER, LYOPHILIZED, FOR SOLUTION INTRAVENOUS PRN
Status: DISCONTINUED | OUTPATIENT
Start: 2019-01-15 | End: 2019-01-15 | Stop reason: SDUPTHER

## 2019-01-15 RX ORDER — PROTAMINE SULFATE 10 MG/ML
INJECTION, SOLUTION INTRAVENOUS PRN
Status: DISCONTINUED | OUTPATIENT
Start: 2019-01-15 | End: 2019-01-15 | Stop reason: SDUPTHER

## 2019-01-15 RX ORDER — DEXAMETHASONE SODIUM PHOSPHATE 4 MG/ML
INJECTION, SOLUTION INTRA-ARTICULAR; INTRALESIONAL; INTRAMUSCULAR; INTRAVENOUS; SOFT TISSUE PRN
Status: DISCONTINUED | OUTPATIENT
Start: 2019-01-15 | End: 2019-01-15 | Stop reason: SDUPTHER

## 2019-01-15 RX ORDER — MAGNESIUM SULFATE IN WATER 40 MG/ML
2 INJECTION, SOLUTION INTRAVENOUS PRN
Status: DISCONTINUED | OUTPATIENT
Start: 2019-01-15 | End: 2019-01-16 | Stop reason: HOSPADM

## 2019-01-15 RX ORDER — SODIUM CHLORIDE 9 MG/ML
INJECTION, SOLUTION INTRAVENOUS CONTINUOUS PRN
Status: DISCONTINUED | OUTPATIENT
Start: 2019-01-15 | End: 2019-01-15 | Stop reason: SDUPTHER

## 2019-01-15 RX ADMIN — PHENYLEPHRINE HYDROCHLORIDE 100 MCG: 10 INJECTION INTRAVENOUS at 11:05

## 2019-01-15 RX ADMIN — PHENYLEPHRINE HYDROCHLORIDE 200 MCG: 10 INJECTION INTRAVENOUS at 10:43

## 2019-01-15 RX ADMIN — FENTANYL CITRATE 50 MCG: 50 INJECTION INTRAMUSCULAR; INTRAVENOUS at 08:12

## 2019-01-15 RX ADMIN — ONDANSETRON HYDROCHLORIDE 4 MG: 2 SOLUTION INTRAMUSCULAR; INTRAVENOUS at 08:25

## 2019-01-15 RX ADMIN — PROPOFOL 200 MG: 10 INJECTION, EMULSION INTRAVENOUS at 08:12

## 2019-01-15 RX ADMIN — PHENYLEPHRINE HYDROCHLORIDE 200 MCG: 10 INJECTION INTRAVENOUS at 10:51

## 2019-01-15 RX ADMIN — DEXAMETHASONE SODIUM PHOSPHATE 4 MG: 4 INJECTION, SOLUTION INTRAMUSCULAR; INTRAVENOUS at 08:25

## 2019-01-15 RX ADMIN — PHENYLEPHRINE HYDROCHLORIDE 100 MCG: 10 INJECTION INTRAVENOUS at 08:26

## 2019-01-15 RX ADMIN — PHENYLEPHRINE HYDROCHLORIDE 100 MCG: 10 INJECTION INTRAVENOUS at 10:58

## 2019-01-15 RX ADMIN — SODIUM CHLORIDE: 9 INJECTION, SOLUTION INTRAVENOUS at 08:04

## 2019-01-15 RX ADMIN — SODIUM CHLORIDE, PRESERVATIVE FREE 10 ML: 5 INJECTION INTRAVENOUS at 21:41

## 2019-01-15 RX ADMIN — PHENYLEPHRINE HYDROCHLORIDE 100 MCG: 10 INJECTION INTRAVENOUS at 09:33

## 2019-01-15 RX ADMIN — PHENYLEPHRINE HYDROCHLORIDE 100 MCG: 10 INJECTION INTRAVENOUS at 10:45

## 2019-01-15 RX ADMIN — VECURONIUM BROMIDE FOR INJECTION 3 MG: 1 INJECTION, POWDER, LYOPHILIZED, FOR SOLUTION INTRAVENOUS at 09:36

## 2019-01-15 RX ADMIN — PHENYLEPHRINE HYDROCHLORIDE 100 MCG: 10 INJECTION INTRAVENOUS at 11:21

## 2019-01-15 RX ADMIN — PHENYLEPHRINE HYDROCHLORIDE 50 MCG: 10 INJECTION INTRAVENOUS at 08:33

## 2019-01-15 RX ADMIN — ENOXAPARIN SODIUM 100 MG: 100 INJECTION SUBCUTANEOUS at 19:07

## 2019-01-15 RX ADMIN — VECURONIUM BROMIDE FOR INJECTION 2 MG: 1 INJECTION, POWDER, LYOPHILIZED, FOR SOLUTION INTRAVENOUS at 09:07

## 2019-01-15 RX ADMIN — PANTOPRAZOLE SODIUM 40 MG: 40 TABLET, DELAYED RELEASE ORAL at 17:30

## 2019-01-15 RX ADMIN — PHENYLEPHRINE HYDROCHLORIDE 50 MCG: 10 INJECTION INTRAVENOUS at 08:53

## 2019-01-15 RX ADMIN — VECURONIUM BROMIDE FOR INJECTION 4 MG: 1 INJECTION, POWDER, LYOPHILIZED, FOR SOLUTION INTRAVENOUS at 08:12

## 2019-01-15 RX ADMIN — PHENYLEPHRINE HYDROCHLORIDE 50 MCG: 10 INJECTION INTRAVENOUS at 08:47

## 2019-01-15 RX ADMIN — PROTAMINE SULFATE 10 MG: 10 INJECTION, SOLUTION INTRAVENOUS at 11:30

## 2019-01-15 RX ADMIN — AMIODARONE HYDROCHLORIDE 200 MG: 200 TABLET ORAL at 17:30

## 2019-01-15 RX ADMIN — PROTAMINE SULFATE 30 MG: 10 INJECTION, SOLUTION INTRAVENOUS at 11:13

## 2019-01-15 RX ADMIN — PHENYLEPHRINE HYDROCHLORIDE 5 MCG: 10 INJECTION INTRAVENOUS at 09:06

## 2019-01-15 RX ADMIN — PHENYLEPHRINE HYDROCHLORIDE 200 MCG: 10 INJECTION INTRAVENOUS at 10:56

## 2019-01-15 RX ADMIN — VECURONIUM BROMIDE FOR INJECTION 4 MG: 1 INJECTION, POWDER, LYOPHILIZED, FOR SOLUTION INTRAVENOUS at 08:39

## 2019-01-15 RX ADMIN — PHENYLEPHRINE HYDROCHLORIDE 100 MCG: 10 INJECTION INTRAVENOUS at 09:05

## 2019-01-15 RX ADMIN — SUGAMMADEX 200 MG: 100 INJECTION, SOLUTION INTRAVENOUS at 11:20

## 2019-01-15 ASSESSMENT — PULMONARY FUNCTION TESTS
PIF_VALUE: 20
PIF_VALUE: 28
PIF_VALUE: 21
PIF_VALUE: 22
PIF_VALUE: 27
PIF_VALUE: 20
PIF_VALUE: 27
PIF_VALUE: 21
PIF_VALUE: 28
PIF_VALUE: 28
PIF_VALUE: 21
PIF_VALUE: 26
PIF_VALUE: 27
PIF_VALUE: 27
PIF_VALUE: 28
PIF_VALUE: 19
PIF_VALUE: 0
PIF_VALUE: 21
PIF_VALUE: 22
PIF_VALUE: 7
PIF_VALUE: 27
PIF_VALUE: 29
PIF_VALUE: 26
PIF_VALUE: 2
PIF_VALUE: 28
PIF_VALUE: 28
PIF_VALUE: 19
PIF_VALUE: 28
PIF_VALUE: 26
PIF_VALUE: 28
PIF_VALUE: 28
PIF_VALUE: 29
PIF_VALUE: 28
PIF_VALUE: 28
PIF_VALUE: 27
PIF_VALUE: 19
PIF_VALUE: 19
PIF_VALUE: 29
PIF_VALUE: 0
PIF_VALUE: 28
PIF_VALUE: 21
PIF_VALUE: 19
PIF_VALUE: 28
PIF_VALUE: 28
PIF_VALUE: 19
PIF_VALUE: 24
PIF_VALUE: 29
PIF_VALUE: 21
PIF_VALUE: 29
PIF_VALUE: 28
PIF_VALUE: 23
PIF_VALUE: 28
PIF_VALUE: 28
PIF_VALUE: 27
PIF_VALUE: 29
PIF_VALUE: 27
PIF_VALUE: 26
PIF_VALUE: 24
PIF_VALUE: 29
PIF_VALUE: 28
PIF_VALUE: 1
PIF_VALUE: 21
PIF_VALUE: 28
PIF_VALUE: 22
PIF_VALUE: 22
PIF_VALUE: 28
PIF_VALUE: 19
PIF_VALUE: 27
PIF_VALUE: 28
PIF_VALUE: 28
PIF_VALUE: 27
PIF_VALUE: 28
PIF_VALUE: 0
PIF_VALUE: 19
PIF_VALUE: 21
PIF_VALUE: 28
PIF_VALUE: 26
PIF_VALUE: 21
PIF_VALUE: 28
PIF_VALUE: 28
PIF_VALUE: 21
PIF_VALUE: 27
PIF_VALUE: 0
PIF_VALUE: 22
PIF_VALUE: 27
PIF_VALUE: 21
PIF_VALUE: 27
PIF_VALUE: 27
PIF_VALUE: 28
PIF_VALUE: 2
PIF_VALUE: 28
PIF_VALUE: 27
PIF_VALUE: 0
PIF_VALUE: 29
PIF_VALUE: 27
PIF_VALUE: 20
PIF_VALUE: 21
PIF_VALUE: 21
PIF_VALUE: 28
PIF_VALUE: 22
PIF_VALUE: 28
PIF_VALUE: 22
PIF_VALUE: 27
PIF_VALUE: 28
PIF_VALUE: 28
PIF_VALUE: 22
PIF_VALUE: 26
PIF_VALUE: 28
PIF_VALUE: 28
PIF_VALUE: 21
PIF_VALUE: 28
PIF_VALUE: 19
PIF_VALUE: 19
PIF_VALUE: 28
PIF_VALUE: 28
PIF_VALUE: 29
PIF_VALUE: 28
PIF_VALUE: 27
PIF_VALUE: 27
PIF_VALUE: 29
PIF_VALUE: 29
PIF_VALUE: 28
PIF_VALUE: 27
PIF_VALUE: 22
PIF_VALUE: 28
PIF_VALUE: 26
PIF_VALUE: 28
PIF_VALUE: 28
PIF_VALUE: 29
PIF_VALUE: 1
PIF_VALUE: 28
PIF_VALUE: 27
PIF_VALUE: 28
PIF_VALUE: 28
PIF_VALUE: 19
PIF_VALUE: 28
PIF_VALUE: 27
PIF_VALUE: 1
PIF_VALUE: 23
PIF_VALUE: 26
PIF_VALUE: 26
PIF_VALUE: 28
PIF_VALUE: 21
PIF_VALUE: 28
PIF_VALUE: 19
PIF_VALUE: 29
PIF_VALUE: 21
PIF_VALUE: 27
PIF_VALUE: 28
PIF_VALUE: 20
PIF_VALUE: 26
PIF_VALUE: 28
PIF_VALUE: 27
PIF_VALUE: 28
PIF_VALUE: 28
PIF_VALUE: 19
PIF_VALUE: 26
PIF_VALUE: 29
PIF_VALUE: 27
PIF_VALUE: 26
PIF_VALUE: 21
PIF_VALUE: 28
PIF_VALUE: 21
PIF_VALUE: 19
PIF_VALUE: 13
PIF_VALUE: 4
PIF_VALUE: 28
PIF_VALUE: 30
PIF_VALUE: 26
PIF_VALUE: 26
PIF_VALUE: 28
PIF_VALUE: 19
PIF_VALUE: 27
PIF_VALUE: 28
PIF_VALUE: 21
PIF_VALUE: 28
PIF_VALUE: 0
PIF_VALUE: 27
PIF_VALUE: 26
PIF_VALUE: 22
PIF_VALUE: 28
PIF_VALUE: 27
PIF_VALUE: 28
PIF_VALUE: 19
PIF_VALUE: 28

## 2019-01-15 ASSESSMENT — PAIN SCALES - GENERAL: PAINLEVEL_OUTOF10: 0

## 2019-01-16 ENCOUNTER — TELEPHONE (OUTPATIENT)
Dept: CARDIOLOGY CLINIC | Age: 49
End: 2019-01-16

## 2019-01-16 VITALS
SYSTOLIC BLOOD PRESSURE: 118 MMHG | HEIGHT: 70 IN | RESPIRATION RATE: 14 BRPM | HEART RATE: 74 BPM | OXYGEN SATURATION: 99 % | WEIGHT: 226.9 LBS | BODY MASS INDEX: 32.48 KG/M2 | TEMPERATURE: 98.2 F | DIASTOLIC BLOOD PRESSURE: 65 MMHG

## 2019-01-16 LAB
ALBUMIN SERPL-MCNC: 3.7 GM/DL (ref 3.4–5)
ANION GAP SERPL CALCULATED.3IONS-SCNC: 10 MMOL/L (ref 4–16)
BUN BLDV-MCNC: 15 MG/DL (ref 6–23)
CALCIUM SERPL-MCNC: 8.9 MG/DL (ref 8.3–10.6)
CHLORIDE BLD-SCNC: 105 MMOL/L (ref 99–110)
CO2: 26 MMOL/L (ref 21–32)
CREAT SERPL-MCNC: 1 MG/DL (ref 0.9–1.3)
EKG ATRIAL RATE: 78 BPM
EKG DIAGNOSIS: NORMAL
EKG P AXIS: 64 DEGREES
EKG P-R INTERVAL: 172 MS
EKG Q-T INTERVAL: 418 MS
EKG QRS DURATION: 102 MS
EKG QTC CALCULATION (BAZETT): 476 MS
EKG R AXIS: -19 DEGREES
EKG T AXIS: 0 DEGREES
EKG VENTRICULAR RATE: 78 BPM
GFR AFRICAN AMERICAN: >60 ML/MIN/1.73M2
GFR NON-AFRICAN AMERICAN: >60 ML/MIN/1.73M2
GLUCOSE BLD-MCNC: 138 MG/DL (ref 70–99)
HCT VFR BLD CALC: 40.2 % (ref 42–52)
HEMOGLOBIN: 13 GM/DL (ref 13.5–18)
MAGNESIUM: 2 MG/DL (ref 1.8–2.4)
MCH RBC QN AUTO: 29.6 PG (ref 27–31)
MCHC RBC AUTO-ENTMCNC: 32.3 % (ref 32–36)
MCV RBC AUTO: 91.6 FL (ref 78–100)
PDW BLD-RTO: 11.9 % (ref 11.7–14.9)
PHOSPHORUS: 3.5 MG/DL (ref 2.5–4.9)
PLATELET # BLD: 262 K/CU MM (ref 140–440)
PMV BLD AUTO: 9.3 FL (ref 7.5–11.1)
POTASSIUM SERPL-SCNC: 4.6 MMOL/L (ref 3.5–5.1)
RBC # BLD: 4.39 M/CU MM (ref 4.6–6.2)
SODIUM BLD-SCNC: 141 MMOL/L (ref 135–145)
WBC # BLD: 9.8 K/CU MM (ref 4–10.5)

## 2019-01-16 PROCEDURE — 84100 ASSAY OF PHOSPHORUS: CPT

## 2019-01-16 PROCEDURE — 99217 PR OBSERVATION CARE DISCHARGE MANAGEMENT: CPT | Performed by: NURSE PRACTITIONER

## 2019-01-16 PROCEDURE — G0378 HOSPITAL OBSERVATION PER HR: HCPCS

## 2019-01-16 PROCEDURE — 36415 COLL VENOUS BLD VENIPUNCTURE: CPT

## 2019-01-16 PROCEDURE — 85027 COMPLETE CBC AUTOMATED: CPT

## 2019-01-16 PROCEDURE — 6370000000 HC RX 637 (ALT 250 FOR IP): Performed by: INTERNAL MEDICINE

## 2019-01-16 PROCEDURE — 80069 RENAL FUNCTION PANEL: CPT

## 2019-01-16 PROCEDURE — 83735 ASSAY OF MAGNESIUM: CPT

## 2019-01-16 RX ORDER — PANTOPRAZOLE SODIUM 40 MG/1
40 TABLET, DELAYED RELEASE ORAL DAILY
Qty: 30 TABLET | Refills: 0 | Status: SHIPPED | OUTPATIENT
Start: 2019-01-16 | End: 2019-05-22 | Stop reason: ALTCHOICE

## 2019-01-16 RX ADMIN — RIVAROXABAN 20 MG: 20 TABLET, FILM COATED ORAL at 08:48

## 2019-01-16 RX ADMIN — AMIODARONE HYDROCHLORIDE 200 MG: 200 TABLET ORAL at 08:48

## 2019-01-16 RX ADMIN — PANTOPRAZOLE SODIUM 40 MG: 40 TABLET, DELAYED RELEASE ORAL at 08:48

## 2019-01-16 ASSESSMENT — PAIN DESCRIPTION - LOCATION: LOCATION: GROIN

## 2019-01-16 ASSESSMENT — PAIN - FUNCTIONAL ASSESSMENT: PAIN_FUNCTIONAL_ASSESSMENT: ACTIVITIES ARE NOT PREVENTED

## 2019-01-16 ASSESSMENT — PAIN DESCRIPTION - FREQUENCY: FREQUENCY: INTERMITTENT

## 2019-01-16 ASSESSMENT — PAIN DESCRIPTION - ORIENTATION: ORIENTATION: LEFT;RIGHT

## 2019-01-16 ASSESSMENT — PAIN DESCRIPTION - ONSET: ONSET: ON-GOING

## 2019-01-16 ASSESSMENT — PAIN DESCRIPTION - PAIN TYPE: TYPE: ACUTE PAIN

## 2019-01-16 ASSESSMENT — PAIN DESCRIPTION - DESCRIPTORS: DESCRIPTORS: ACHING

## 2019-01-16 ASSESSMENT — PAIN SCALES - GENERAL: PAINLEVEL_OUTOF10: 4

## 2019-02-06 ENCOUNTER — OFFICE VISIT (OUTPATIENT)
Dept: CARDIOLOGY CLINIC | Age: 49
End: 2019-02-06
Payer: COMMERCIAL

## 2019-02-06 VITALS
HEART RATE: 141 BPM | DIASTOLIC BLOOD PRESSURE: 70 MMHG | BODY MASS INDEX: 32.3 KG/M2 | SYSTOLIC BLOOD PRESSURE: 100 MMHG | WEIGHT: 225.6 LBS | HEIGHT: 70 IN

## 2019-02-06 DIAGNOSIS — I47.1 ATRIAL TACHYCARDIA (HCC): Primary | ICD-10-CM

## 2019-02-06 DIAGNOSIS — I48.91 ATRIAL FIBRILLATION WITH RAPID VENTRICULAR RESPONSE (HCC): ICD-10-CM

## 2019-02-06 PROCEDURE — 93000 ELECTROCARDIOGRAM COMPLETE: CPT | Performed by: INTERNAL MEDICINE

## 2019-02-06 PROCEDURE — 3017F COLORECTAL CA SCREEN DOC REV: CPT | Performed by: INTERNAL MEDICINE

## 2019-02-06 PROCEDURE — 99213 OFFICE O/P EST LOW 20 MIN: CPT | Performed by: INTERNAL MEDICINE

## 2019-02-06 PROCEDURE — G8427 DOCREV CUR MEDS BY ELIG CLIN: HCPCS | Performed by: INTERNAL MEDICINE

## 2019-02-06 PROCEDURE — G8484 FLU IMMUNIZE NO ADMIN: HCPCS | Performed by: INTERNAL MEDICINE

## 2019-02-06 PROCEDURE — 4004F PT TOBACCO SCREEN RCVD TLK: CPT | Performed by: INTERNAL MEDICINE

## 2019-02-06 PROCEDURE — G8417 CALC BMI ABV UP PARAM F/U: HCPCS | Performed by: INTERNAL MEDICINE

## 2019-02-07 ENCOUNTER — HOSPITAL ENCOUNTER (OUTPATIENT)
Dept: NON INVASIVE DIAGNOSTICS | Age: 49
Discharge: HOME | End: 2019-02-07
Payer: COMMERCIAL

## 2019-02-07 ENCOUNTER — TELEPHONE (OUTPATIENT)
Dept: CARDIOLOGY CLINIC | Age: 49
End: 2019-02-07

## 2019-02-07 ENCOUNTER — ANESTHESIA (OUTPATIENT)
Dept: CARDIAC CATH/INVASIVE PROCEDURES | Age: 49
End: 2019-02-07
Payer: COMMERCIAL

## 2019-02-07 ENCOUNTER — ANESTHESIA EVENT (OUTPATIENT)
Dept: CARDIAC CATH/INVASIVE PROCEDURES | Age: 49
End: 2019-02-07
Payer: COMMERCIAL

## 2019-02-07 ENCOUNTER — HOSPITAL ENCOUNTER (OUTPATIENT)
Dept: CARDIAC CATH/INVASIVE PROCEDURES | Age: 49
Discharge: HOME | End: 2019-02-07
Attending: INTERNAL MEDICINE | Admitting: INTERNAL MEDICINE
Payer: COMMERCIAL

## 2019-02-07 VITALS
DIASTOLIC BLOOD PRESSURE: 75 MMHG | RESPIRATION RATE: 16 BRPM | SYSTOLIC BLOOD PRESSURE: 110 MMHG | OXYGEN SATURATION: 98 %

## 2019-02-07 VITALS
WEIGHT: 225 LBS | TEMPERATURE: 98.2 F | SYSTOLIC BLOOD PRESSURE: 107 MMHG | DIASTOLIC BLOOD PRESSURE: 81 MMHG | BODY MASS INDEX: 32.21 KG/M2 | HEART RATE: 75 BPM | HEIGHT: 70 IN | OXYGEN SATURATION: 100 % | RESPIRATION RATE: 14 BRPM

## 2019-02-07 LAB
ANION GAP SERPL CALCULATED.3IONS-SCNC: 11 MMOL/L (ref 4–16)
APTT: 50.9 SECONDS (ref 21.2–33)
BUN BLDV-MCNC: 17 MG/DL (ref 6–23)
CALCIUM SERPL-MCNC: 9.1 MG/DL (ref 8.3–10.6)
CHLORIDE BLD-SCNC: 105 MMOL/L (ref 99–110)
CO2: 26 MMOL/L (ref 21–32)
CREAT SERPL-MCNC: 1.3 MG/DL (ref 0.9–1.3)
GFR AFRICAN AMERICAN: >60 ML/MIN/1.73M2
GFR NON-AFRICAN AMERICAN: 58 ML/MIN/1.73M2
GLUCOSE BLD-MCNC: 120 MG/DL (ref 70–99)
HCT VFR BLD CALC: 45.3 % (ref 42–52)
HEMOGLOBIN: 14.7 GM/DL (ref 13.5–18)
INR BLD: 2.14 INDEX
MAGNESIUM: 2.1 MG/DL (ref 1.8–2.4)
MCH RBC QN AUTO: 29.6 PG (ref 27–31)
MCHC RBC AUTO-ENTMCNC: 32.5 % (ref 32–36)
MCV RBC AUTO: 91.3 FL (ref 78–100)
PDW BLD-RTO: 13.1 % (ref 11.7–14.9)
PHOSPHORUS: 3.2 MG/DL (ref 2.5–4.9)
PLATELET # BLD: 262 K/CU MM (ref 140–440)
PMV BLD AUTO: 9.8 FL (ref 7.5–11.1)
POTASSIUM SERPL-SCNC: 4.9 MMOL/L (ref 3.5–5.1)
PROTHROMBIN TIME: 24.2 SECONDS (ref 9.12–12.5)
RBC # BLD: 4.96 M/CU MM (ref 4.6–6.2)
SODIUM BLD-SCNC: 142 MMOL/L (ref 135–145)
WBC # BLD: 6.9 K/CU MM (ref 4–10.5)

## 2019-02-07 PROCEDURE — 93005 ELECTROCARDIOGRAM TRACING: CPT

## 2019-02-07 PROCEDURE — 92960 CARDIOVERSION ELECTRIC EXT: CPT

## 2019-02-07 PROCEDURE — 2500000003 HC RX 250 WO HCPCS: Performed by: NURSE ANESTHETIST, CERTIFIED REGISTERED

## 2019-02-07 PROCEDURE — 2709999900 HC NON-CHARGEABLE SUPPLY

## 2019-02-07 PROCEDURE — 6360000002 HC RX W HCPCS

## 2019-02-07 PROCEDURE — 6360000002 HC RX W HCPCS: Performed by: NURSE ANESTHETIST, CERTIFIED REGISTERED

## 2019-02-07 PROCEDURE — 3700000001 HC ADD 15 MINUTES (ANESTHESIA)

## 2019-02-07 PROCEDURE — 85610 PROTHROMBIN TIME: CPT

## 2019-02-07 PROCEDURE — 85027 COMPLETE CBC AUTOMATED: CPT

## 2019-02-07 PROCEDURE — 85730 THROMBOPLASTIN TIME PARTIAL: CPT

## 2019-02-07 PROCEDURE — 84100 ASSAY OF PHOSPHORUS: CPT

## 2019-02-07 PROCEDURE — 92960 CARDIOVERSION ELECTRIC EXT: CPT | Performed by: INTERNAL MEDICINE

## 2019-02-07 PROCEDURE — 3700000000 HC ANESTHESIA ATTENDED CARE

## 2019-02-07 PROCEDURE — 2500000003 HC RX 250 WO HCPCS

## 2019-02-07 PROCEDURE — 2580000003 HC RX 258: Performed by: NURSE ANESTHETIST, CERTIFIED REGISTERED

## 2019-02-07 PROCEDURE — 80048 BASIC METABOLIC PNL TOTAL CA: CPT

## 2019-02-07 PROCEDURE — 83735 ASSAY OF MAGNESIUM: CPT

## 2019-02-07 RX ORDER — DILTIAZEM HYDROCHLORIDE 120 MG/1
120 CAPSULE, COATED, EXTENDED RELEASE ORAL DAILY
Qty: 30 CAPSULE | Refills: 3 | Status: ON HOLD | OUTPATIENT
Start: 2019-02-07 | End: 2019-05-26 | Stop reason: HOSPADM

## 2019-02-07 RX ORDER — LIDOCAINE HYDROCHLORIDE 20 MG/ML
INJECTION, SOLUTION INFILTRATION; PERINEURAL PRN
Status: DISCONTINUED | OUTPATIENT
Start: 2019-02-07 | End: 2019-02-07 | Stop reason: SDUPTHER

## 2019-02-07 RX ORDER — SODIUM CHLORIDE 9 MG/ML
INJECTION, SOLUTION INTRAVENOUS CONTINUOUS PRN
Status: DISCONTINUED | OUTPATIENT
Start: 2019-02-07 | End: 2019-02-07 | Stop reason: SDUPTHER

## 2019-02-07 RX ORDER — PROPOFOL 10 MG/ML
INJECTION, EMULSION INTRAVENOUS PRN
Status: DISCONTINUED | OUTPATIENT
Start: 2019-02-07 | End: 2019-02-07 | Stop reason: SDUPTHER

## 2019-02-07 RX ADMIN — SODIUM CHLORIDE: 9 INJECTION, SOLUTION INTRAVENOUS at 09:12

## 2019-02-07 RX ADMIN — LIDOCAINE HYDROCHLORIDE 100 MG: 20 INJECTION, SOLUTION INFILTRATION; PERINEURAL at 09:19

## 2019-02-07 RX ADMIN — PROPOFOL 100 MG: 10 INJECTION, EMULSION INTRAVENOUS at 09:19

## 2019-02-07 ASSESSMENT — PULMONARY FUNCTION TESTS
PIF_VALUE: 1

## 2019-02-08 LAB
EKG ATRIAL RATE: 136 BPM
EKG DIAGNOSIS: NORMAL
EKG P AXIS: -6 DEGREES
EKG P-R INTERVAL: 136 MS
EKG Q-T INTERVAL: 326 MS
EKG QRS DURATION: 122 MS
EKG QTC CALCULATION (BAZETT): 490 MS
EKG R AXIS: 28 DEGREES
EKG T AXIS: 45 DEGREES
EKG VENTRICULAR RATE: 136 BPM

## 2019-02-14 ENCOUNTER — OFFICE VISIT (OUTPATIENT)
Dept: CARDIOLOGY CLINIC | Age: 49
End: 2019-02-14
Payer: COMMERCIAL

## 2019-02-14 VITALS
HEART RATE: 78 BPM | SYSTOLIC BLOOD PRESSURE: 130 MMHG | WEIGHT: 226.2 LBS | BODY MASS INDEX: 32.38 KG/M2 | HEIGHT: 70 IN | DIASTOLIC BLOOD PRESSURE: 70 MMHG

## 2019-02-14 DIAGNOSIS — I48.91 ATRIAL FIBRILLATION, UNSPECIFIED TYPE (HCC): Primary | ICD-10-CM

## 2019-02-14 DIAGNOSIS — Z98.890 S/P ABLATION OF ATRIAL FIBRILLATION: ICD-10-CM

## 2019-02-14 DIAGNOSIS — Z98.890 S/P ABLATION OF ATRIAL FLUTTER: ICD-10-CM

## 2019-02-14 DIAGNOSIS — Z86.79 S/P ABLATION OF ATRIAL FIBRILLATION: ICD-10-CM

## 2019-02-14 DIAGNOSIS — Z86.79 S/P ABLATION OF ATRIAL FLUTTER: ICD-10-CM

## 2019-02-14 DIAGNOSIS — I48.92 ATRIAL FLUTTER, UNSPECIFIED TYPE (HCC): ICD-10-CM

## 2019-02-14 PROCEDURE — 93000 ELECTROCARDIOGRAM COMPLETE: CPT | Performed by: INTERNAL MEDICINE

## 2019-02-14 PROCEDURE — 99213 OFFICE O/P EST LOW 20 MIN: CPT | Performed by: NURSE PRACTITIONER

## 2019-02-18 ENCOUNTER — TELEPHONE (OUTPATIENT)
Dept: CARDIOLOGY CLINIC | Age: 49
End: 2019-02-18

## 2019-03-22 RX ORDER — AMIODARONE HYDROCHLORIDE 200 MG/1
200 TABLET ORAL DAILY
Qty: 30 TABLET | Refills: 1 | Status: SHIPPED | OUTPATIENT
Start: 2019-03-22 | End: 2019-05-22 | Stop reason: ALTCHOICE

## 2019-05-14 ENCOUNTER — TELEPHONE (OUTPATIENT)
Dept: CARDIOLOGY CLINIC | Age: 49
End: 2019-05-14

## 2019-05-14 NOTE — TELEPHONE ENCOUNTER
Left message for patient need to reschedule OV with Dr Farhana Strange.  Asked to return call when available ask for [facility]

## 2019-05-14 NOTE — TELEPHONE ENCOUNTER
Patient returned call and was okay to move to next week with Marcos Bentley NP.  Patient has been doing great

## 2019-05-22 ENCOUNTER — OFFICE VISIT (OUTPATIENT)
Dept: CARDIOLOGY CLINIC | Age: 49
End: 2019-05-22
Payer: COMMERCIAL

## 2019-05-22 VITALS
HEART RATE: 133 BPM | BODY MASS INDEX: 30.78 KG/M2 | WEIGHT: 215 LBS | HEIGHT: 70 IN | SYSTOLIC BLOOD PRESSURE: 136 MMHG | DIASTOLIC BLOOD PRESSURE: 84 MMHG

## 2019-05-22 DIAGNOSIS — I48.91 ATRIAL FIBRILLATION, UNSPECIFIED TYPE (HCC): ICD-10-CM

## 2019-05-22 DIAGNOSIS — I47.1 ATRIAL TACHYCARDIA (HCC): Primary | ICD-10-CM

## 2019-05-22 PROCEDURE — 99213 OFFICE O/P EST LOW 20 MIN: CPT | Performed by: INTERNAL MEDICINE

## 2019-05-22 PROCEDURE — 93000 ELECTROCARDIOGRAM COMPLETE: CPT | Performed by: INTERNAL MEDICINE

## 2019-05-22 PROCEDURE — 4004F PT TOBACCO SCREEN RCVD TLK: CPT | Performed by: INTERNAL MEDICINE

## 2019-05-22 PROCEDURE — G8417 CALC BMI ABV UP PARAM F/U: HCPCS | Performed by: INTERNAL MEDICINE

## 2019-05-22 PROCEDURE — 3017F COLORECTAL CA SCREEN DOC REV: CPT | Performed by: INTERNAL MEDICINE

## 2019-05-22 PROCEDURE — G8427 DOCREV CUR MEDS BY ELIG CLIN: HCPCS | Performed by: INTERNAL MEDICINE

## 2019-05-22 NOTE — PROGRESS NOTES
Electrophysiology FU Note      Chief complaint :  SOB    Referring physician:        Primary care physician: Tanner Chanel DO      History of Present Illness: This visit (5/23/2019)      Chief Complaint   Patient presents with    Atrial Fibrillation     OV for 3 month check on afib. Denies chest pain, shortness of breath. Denies palpitations  Feels over all okay - some tiredness he reports. Previous visit:(2/6/2019)      Chief Complaint   Patient presents with    Tachycardia     Pt is here for a 3 wk f/u ablation. Pt denies chest pain, shortness of breath, dizziness, palpitations, and edema. Previous visit: (1/9/2018)      Chief Complaint   Patient presents with    Irregular Heart Beat     [place] patient here to follow up after cardioversion 12/26. Felt good post cardioversion for few days and then he started back having problems. Pt c/o SOB all the time but not new or worse than before. Pt denies CP, dizziness, palpitations or edema. Patient receiveing Xarelto samples and needs amio refill.  Shortness of Breath           Previous visit (12/12/2018)      Chief Complaint   Patient presents with    Chest Pain      patient sent here from PCP visit this morning. Pt c/o chest pains, radiating into neck, shoulder and hip pain. Sob with activity, palpitations, migraines, dizziness and cold sweats. Episodes have happened at work while standing on his machine. Symptoms are getting worse after experiencing them for the past few months. Pt denies edema.  Shortness of Breath    Palpitations    Dizziness           Previous visit:2/10/2016)      Chief Complaint   Patient presents with    6 Month Follow-Up     Patient is here for 6 month OV, he denies any chest pain, palpitations, dizziness, SOB or edema. Pt wants to discuss some of his medications with you.       Over all he feels lot better  He reports arthralgia at times - question of medication induced  Metoprolol makes him tired for some time after he takes it. Previous visit: (7/10/2015)      Chief Complaint   Patient presents with    Check-Up     2 month follow up. Denies dizziness, edema, and palpitations. He hasn't felt like he has been in A-Fib since having the ablation. Had great energies and was able to work with out any problem till he has come down with this URI       Chest Congestion     Patient states that for the last 3 weeks he has been feeling a congestion in his chest. He says it feels like he might be getting pneumonia. When he lays down it feels like there is a weight on his chest. He says he also feels achy in his joints. He was too scared to take anything that might cause him to go back into A-Fib. Sputum - yellowish      Shortness of Breath     Patient states that he feels short of breath with stairs only this started with URI symptoms. Denies chest pain,  palpitations, syncope or presyncope, edema        Previous visit: 2/20/2015      Chief Complaint   Patient presents with    Fatigue     Patient had cardioversion on 01/23/15. He states he did okay until last 2 days he started feeling tired, he went to PCP who did EKG and told him that he was having flutter. Patient states that he has been having some chest pain and sweating when his heart rate is fast. After taking digoxin he felt better      called me from his clinic. I recommended to start on low dose digoxin as patient did not want to go to hospital for rapid ventricular rate. I arranged an appointment today so that I can assess patient. Patient feels okay now but tired. Has low energy and after work feels very drained. Previous visit:    Chief Complaint   Patient presents with    Fatigue     Patient here for 3 week follow up with EKG. Patient states that he feels tired all of the time. He states that he had labs with PCP on 01/16/15 and again on yesterday.  Patient is here to discuss possible cardioversion. Tired all the time    Patient had an LA clot when we performed CARMENZA since then on anticoagulation - 4 weeks    Chest tightness off and on - with palpitations  Shortness of breath Yes with palpitations  Palpitations Yes twice an hour  Syncope or Presyncope No  Edema No  Claudication No        Past Medical History:   Diagnosis Date    Arrhythmia 11/2014    Atrial fib    Arthritis     right shoulder    H/O cardiovascular stress test 12/12/2014    cardiolite-normal, no ischemia    H/O echocardiogram 4/13/15    EF 50-55% Mildly dilated left atrium. Borderline sized right ventricle. Trace MR & TR. Past Surgical History:   Procedure Laterality Date    ACHILLES TENDON SURGERY  2005    repair torn [place]'s tendon    BACK SURGERY      CARDIOVERSION  11/17/14    HERNIA REPAIR      OTHER SURGICAL HISTORY  3/10/15    cardiac ablation for a fib/ a flutter    SHOULDER SURGERY  2010    right shoulder rotator cuff surgery       Family History   Problem Relation Age of Onset    Cancer Father     Diabetes Father         reports that he has never smoked. His smokeless tobacco use includes chew. He reports that he drinks alcohol. He reports that he does not use drugs. Allergies   Allergen Reactions    Codeine Nausea Only    Pcn [Penicillins] Rash    Sulfa Antibiotics Rash       No current facility-administered medications for this visit. No current outpatient medications on file. Facility-Administered Medications Ordered in Other Visits   Medication Dose Route Frequency Provider Last Rate Last Dose    midazolam (VERSED) injection 2 mg  2 mg Intravenous Once Scott Valdez MD           Review of Systems:   Review of Systems   Constitutional:  Tired and weak. Negative for fever, chills and activity change. HENT: Negative for congestion, ear pain and tinnitus. Eyes: Negative for photophobia, pain and visual disturbance.    Respiratory:  shortness of breath Negative for cough, chest tightness and wheezing. Cardiovascular:  chest pain and palpitations Negative for leg swelling. Gastrointestinal: Negative for nausea, vomiting, abdominal pain, diarrhea, constipation and blood in stool. Endocrine: Negative for cold intolerance and heat intolerance. Genitourinary: Negative for dysuria, hematuria and flank pain. Musculoskeletal: Negative for myalgias, and neck stiffness. Arthralgias  Skin: Negative for color change and rash. Allergic/Immunologic: Negative for food allergies. Neurological:  Negative for dizziness, light-headedness, numbness and headaches. Hematological: Does not bruise/bleed easily. Psychiatric/Behavioral: Negative for behavioral problems, confusion and agitation. Physical Examination:    /84   Pulse 133   Ht 5' 10\" (1.778 m)   Wt 215 lb (97.5 kg)   BMI 30.85 kg/m²    Wt Readings from Last 3 Encounters:   05/23/19 215 lb (97.5 kg)   05/22/19 215 lb (97.5 kg)   02/14/19 226 lb 3.2 oz (102.6 kg)     Body mass index is 30.85 kg/m². Physical Exam   Constitutional: He is oriented to person, place, and time and well-developed, well-nourished, and in no distress. HENT:   Head: Normocephalic and atraumatic. Eyes: Conjunctivae and EOM are normal. Pupils are equal, round, and reactive to light. Right eye exhibits no discharge. Neck: Normal range of motion. No JVD present. No thyromegaly present. Cardiovascular: Regular rhythm and  tachycardic. Exam reveals no friction rub. No murmur heard. Pulmonary/Chest: Effort normal and breath sounds normal. No stridor. No respiratory distress. He has no wheezes. Abdominal: Soft. Bowel sounds are normal. He exhibits no distension. There is no tenderness. Musculoskeletal: Normal range of motion. He exhibits no edema or tenderness. Neurological: He is alert and oriented to person, place, and time. He displays normal reflexes. No cranial nerve deficit.  Coordination normal.   Skin: Skin is warm and dry. No rash noted. No erythema. Psychiatric: Mood and affect normal.           CBC:   Lab Results   Component Value Date    WBC 6.8 05/23/2019    HGB 15.7 05/23/2019    HCT 47.5 05/23/2019     05/23/2019     Lipids:   Lab Results   Component Value Date    CHOL 151 07/12/2018     PT/INR:   No components found for: PT,  INR     BMP:    Lab Results   Component Value Date     05/23/2019    K 4.9 05/23/2019     05/23/2019    CO2 24 05/23/2019    BUN 20 05/23/2019     CMP:   Lab Results   Component Value Date    AST 22 07/11/2018    PROT 6.4 07/11/2018    BILITOT 0.6 07/11/2018    ALKPHOS 81 07/11/2018     TSH:    No results found for: TSH    EKGINTERPRETATION - EKG Interpretation:  Atrial tachycardiar with RVR      IMPRESSION / RECOMMENDATIONS:     * Atrial tachycardia or flutter  Atrial fibrillation sp ablation  Atrial flutter sp ablation  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  Patient atrial tachycardia was terminated during ablation and multiple tachycardia - post ablation we could not induce tachycardia post ablation     Did not have any arrhythmia post ablation    Could be another source    Will cardiovert tomorrow and attempt tikosyn    Amiodarone was stopped by patient      Niraj Paredes MD

## 2019-05-22 NOTE — LETTER
[facility]     Dr. Bette Pettit     Transesophageal Echocardiogram with Cardioversion with Tikosyn Loading    Patient Name: Jeff Gonzalez  : 1970   MRN# [id number]     Date of Procedure: 19 Time: 1200 Arrival Time: [address]: [facility])     Call to Pre[place] at [phone] 2 days before your procedure    X Please do not have anything by mouth after midnight prior to or 8 hours before the procedure. X You may take your medication with a sip of water unless advised otherwise below. X  Please continue to take Xarelto (rivaroxaban) as directed. Patient Signature:____________________________ Staff Prepared: [facility]     Staff Given Instructions:_______________________________                                                  [address] [facility]  What is cardioversion? Cardioversion helps your heart return to a normal rhythm. It treats problems like atrial fibrillation. It is also sometimes used in emergencies. It can correct a fast heartbeat that causes low blood pressure, chest pain, or heart failure. Your doctor may ask you to take medicines before the treatment. These help prevent blood clots. Your doctor will watch you closely to make sure that there are no problems. Electrical cardioversion: The electrical procedure is done in a hospital. You will get medicine to help you relax and control the pain. Your doctor will put paddles or patches on your chest and back. These send an electric current to your heart. This resets your heart rhythm. The electrical part takes about 5 minutes. But you will probably be in the hospital for 1 to 2 hours. You will need to recover from the effects of the pain medicine.   Chemical cardioversion: The chemical procedure is most often done in a hospital. In most cases, the medicine is put into your arm through a tube called an IV. But you may get medicines to take by mouth. You may feel a quick sting or pinch when the IV starts. The procedure usually takes about 30 to 60 minutes for procedure. Transesophageal Echocardiogram  What is a transesophageal echocardiogram?  A transesophageal echocardiogram is a test to help your doctor look at the inside of your heart. A small device called a transducer directs sound waves toward your heart. The sound waves make a picture of the heart's valves and chambers. Your doctor may do this test to look for certain types of heart disease. Or it may be done to see how disease is affecting your heart. You will be given medicine to make you sleepy and comfortable during the test. The doctor puts a small, flexible tube into your throat and guides it to the esophagus. This is the tube that connects your mouth to your stomach. The doctor will ask you to swallow as the tube goes down. The transducer is at the tip of the tube. It gets close to your heart to make clear pictures. The doctor will look at the ultrasound pictures on a screen. You will not be able to eat or drink until the numbness from the throat spray wears off. Your throat may be sore for a few days after the test.  Follow-up care is a key part of your treatment and safety. Be sure to make and go to all appointments, and call your doctor if you are having problems. It's also a good idea to know your test results and keep a list of the medicines you take. [address]/CARDIOLOGY        Patient Name: Kobi Giles  : 1970   MRN# [id number]    Transesophageal Echocardiogram with Cardioversion with Tikosyn Loading    Day of Procedure Cath Lab Holding Area Preop Orders:    ? YOU HAVE MY PERMISSION TO TALK TO THE PATIENT-PLEASE    ? Anesthesia    ? CARMENZA with Anesthesia    ? IV peripheral saline lock  (preferred left arm). ? STAT LABS ON ARRIVAL: BMP, MAG, PHOS, CBC, PT INR, PTT    ? If taking Coumadin, PT INR  STAT on arrival day of procedure. ? 12 lead EKG STAT on arrival day of procedure. ? Diabetic patients >> Accu check Blood sugar check. ? Document home medications in EPIC and include date and time of last dose.    ? NPO.    ? Notify Dr. Jero Rosas of abnormal lab results. ? Chest Prep> Clip hair anterior chest and posterior back. ?   ? If Tikosyn loading  Planned >>3 [address] [facility]            Physician Signature:_______________________Date:___________Time:____________                        [facility]) Informed Consent for Anesthesia/Sedation, Surgery, Invasive Procedures, and other High-risk Interventions and Medication use     *This consent is applicable for 30 days following patient signature*    Procedure(s)   IJuan authorize, Dr. Luisa Mata   and the associate(s) or assistant(s) of his/her choice, to perform the following procedure(s):Transesophageal Echocardiogram with Cardioversion with Tikosyn Loading    I know that unexpected conditions may require additional or different procedures than those above. I authorize the above named practitioner(s) perform these as necessary and desirable. This is based on the practitioners professional judgment. The above named practitioner has discussed the above procedure(s) with me, including:  ? Potential benefits, including likelihood of success of the procedure(s) goals  ? Risks  ? Side effects, risk of death, and risk of infection  ? Any potential problems that might occur during recuperation or healing post-procedure  ? Reasonable alternatives  ? Risks of NOT performing the procedure(s)    I acknowledge that no warranty or guarantee has been made to the results the procedure(s).    I consent to the above named practitioner(s) providing additional services to me as deemed reasonable and necessary, including but not limited to: ? Use of medications for anesthesia or sedation. ? All anesthesia and sedation carry risks. My practitioner has discussed my anticipated anesthesia and/or sedation and the risks of using, risk of not using, benefits, side effects, and alternatives. ? Use of pathology  ? I authorize [facility] to dispose of tissues, specimens or organs when pathology is complete. ? Use of radiology  ? A contrast agent may be required for radiology procedures. My practitioner has advised me of the risks of using, risks of not using, benefits, side effects, and alternatives. ? Observers or use of photography, video/audio recording, or televising of the procedure(s). This is for medical, scientific, or educational purposes. This includes appropriate portions of my body. My identity will not be revealed. ? I consent to release of my social security number and other identifying information to [employer] (FDA), and the supplier/, if I receive tissue, a device, or implant. This is to track the tissue, device, or implant for defect, recall, infection, etc.     ? Use of blood and/or blood products, if needed, through my hospital stay. My practitioner has advised me of the risks of using, risks of not using, benefits, side effects, and alternatives. ___ I do NOT want Blood or Blood products given. (Complete separate  refusal form)    Code Status (chad one):  ___ I do NOT HAVE a DNR order. I am a Full-code.   I will receive CPR, intubation,  chest compressions, medications, and/or other life saving measures if I have a  cardiac or respiratory arrest.    ___ I have a Do Not Resuscitate (DNR)order.   (chad one below)  ___  I rescind my DNR for surgery and immediate post-operative period through Phase 2 recovery.    This means, for that time period, I will be a Full-code and receive CPR, intubation, chest compressions, medications, and/or other life saving measures, if I have a cardiac or respiratory arrest.    ___ I WANT to keep my DNR in effect during my procedure(s) and immediate post-operative recovery period through Phase 2 recovery. (Complete separate refusal form)     This form has been fully explained to me. I understand its contents. Patients Signature: ___________________________Date: ________  Time: ________    If patient unable to sign, has engaged the [address] [place] [address], is a minor, or has a court-appointed Guardian:  [facility] Representative Name (Print):  ____________________________________      Relationship ([place] one):    Guardian   Parent    Spouse    HCPOA   Child   Sibling  Next-of-Kin Friend    Patients Representative Signature: _______________________________________              Date: ______________  Time: __________    An  was used.  name/ID: _________________________________      [facility] Witness________________________  Date: ________   Time: _________    Physician/Practitioner _______________________  Date: ________   Time: _________           Revision 2017      Char Reynoso     PROCEDURE TO SCHEDULE:    Transesophageal Echocardiogram with Cardioversion with Tikosyn Loading    Patient Name: Green Signs  : 1970   MRN# [id number]    Home Phone Number: [phone]   Weight:    Wt Readings from Last 3 Encounters:   19 215 lb (97.5 kg)   19 226 lb 3.2 oz (102.6 kg)   19 225 lb (102.1 kg)        Insurance: Payor: Lj Paula / Plan: [place] / Product Type: *No Product type* /     Date of Procedure: 19 Time: 1200 Arrival Time: 1000    Diagnosis:  Atrial Fibrillation   Allergies:    Allergies   Allergen Reactions    Codeine Nausea Only    Pcn [Penicillins] Rash    Sulfa Antibiotics Rash        1) Call [facility] scheduling ([phone]) or Instant Message CONFIRMED WITH    PHONE OR   INSTANT MESSAGE  2) PREAUTHORIZATION NUMBER:   Spoke to:     From date:    expiration date:         Brandt Stonewall

## 2019-05-23 ENCOUNTER — HOSPITAL ENCOUNTER (INPATIENT)
Dept: CARDIAC CATH/INVASIVE PROCEDURES | Age: 49
LOS: 3 days | Discharge: HOME | DRG: 310 | End: 2019-05-26
Attending: INTERNAL MEDICINE | Admitting: INTERNAL MEDICINE
Payer: COMMERCIAL

## 2019-05-23 ENCOUNTER — ANESTHESIA (OUTPATIENT)
Dept: CARDIAC CATH/INVASIVE PROCEDURES | Age: 49
DRG: 310 | End: 2019-05-23
Payer: COMMERCIAL

## 2019-05-23 ENCOUNTER — ANESTHESIA EVENT (OUTPATIENT)
Dept: CARDIAC CATH/INVASIVE PROCEDURES | Age: 49
DRG: 310 | End: 2019-05-23
Payer: COMMERCIAL

## 2019-05-23 VITALS
RESPIRATION RATE: 16 BRPM | SYSTOLIC BLOOD PRESSURE: 109 MMHG | DIASTOLIC BLOOD PRESSURE: 74 MMHG | OXYGEN SATURATION: 100 %

## 2019-05-23 PROBLEM — Z51.81 VISIT FOR MONITORING TIKOSYN THERAPY: Status: ACTIVE | Noted: 2019-05-23

## 2019-05-23 PROBLEM — Z79.899 VISIT FOR MONITORING TIKOSYN THERAPY: Status: ACTIVE | Noted: 2019-05-23

## 2019-05-23 LAB
ANION GAP SERPL CALCULATED.3IONS-SCNC: 11 MMOL/L (ref 4–16)
BUN BLDV-MCNC: 20 MG/DL (ref 6–23)
CALCIUM SERPL-MCNC: 9.1 MG/DL (ref 8.3–10.6)
CHLORIDE BLD-SCNC: 105 MMOL/L (ref 99–110)
CO2: 24 MMOL/L (ref 21–32)
CREAT SERPL-MCNC: 1 MG/DL (ref 0.9–1.3)
EKG ATRIAL RATE: 260 BPM
EKG ATRIAL RATE: 85 BPM
EKG DIAGNOSIS: NORMAL
EKG DIAGNOSIS: NORMAL
EKG P AXIS: 71 DEGREES
EKG P-R INTERVAL: 184 MS
EKG Q-T INTERVAL: 388 MS
EKG Q-T INTERVAL: 416 MS
EKG QRS DURATION: 106 MS
EKG QRS DURATION: 108 MS
EKG QTC CALCULATION (BAZETT): 461 MS
EKG QTC CALCULATION (BAZETT): 612 MS
EKG R AXIS: -35 DEGREES
EKG R AXIS: 25 DEGREES
EKG T AXIS: 23 DEGREES
EKG T AXIS: 49 DEGREES
EKG VENTRICULAR RATE: 130 BPM
EKG VENTRICULAR RATE: 85 BPM
GFR AFRICAN AMERICAN: >60 ML/MIN/1.73M2
GFR NON-AFRICAN AMERICAN: >60 ML/MIN/1.73M2
GLUCOSE BLD-MCNC: 107 MG/DL (ref 70–99)
HCT VFR BLD CALC: 47.5 % (ref 42–52)
HEMOGLOBIN: 15.7 GM/DL (ref 13.5–18)
INR BLD: 1.8 INDEX
MAGNESIUM: 2 MG/DL (ref 1.8–2.4)
MCH RBC QN AUTO: 30.3 PG (ref 27–31)
MCHC RBC AUTO-ENTMCNC: 33.1 % (ref 32–36)
MCV RBC AUTO: 91.5 FL (ref 78–100)
PDW BLD-RTO: 12.3 % (ref 11.7–14.9)
PHOSPHORUS: 2.8 MG/DL (ref 2.5–4.9)
PLATELET # BLD: 232 K/CU MM (ref 140–440)
PMV BLD AUTO: 9.7 FL (ref 7.5–11.1)
POTASSIUM SERPL-SCNC: 4.9 MMOL/L (ref 3.5–5.1)
PROTHROMBIN TIME: 20.8 SECONDS (ref 9.12–12.5)
RBC # BLD: 5.19 M/CU MM (ref 4.6–6.2)
SODIUM BLD-SCNC: 140 MMOL/L (ref 135–145)
WBC # BLD: 6.8 K/CU MM (ref 4–10.5)

## 2019-05-23 PROCEDURE — 93320 DOPPLER ECHO COMPLETE: CPT | Performed by: INTERNAL MEDICINE

## 2019-05-23 PROCEDURE — 7100000001 HC PACU RECOVERY - ADDTL 15 MIN

## 2019-05-23 PROCEDURE — 2140000000 HC CCU INTERMEDIATE R&B

## 2019-05-23 PROCEDURE — B24BZZ4 ULTRASONOGRAPHY OF HEART WITH AORTA, TRANSESOPHAGEAL: ICD-10-PCS | Performed by: INTERNAL MEDICINE

## 2019-05-23 PROCEDURE — 80048 BASIC METABOLIC PNL TOTAL CA: CPT

## 2019-05-23 PROCEDURE — 6360000002 HC RX W HCPCS

## 2019-05-23 PROCEDURE — 93005 ELECTROCARDIOGRAM TRACING: CPT | Performed by: INTERNAL MEDICINE

## 2019-05-23 PROCEDURE — 83735 ASSAY OF MAGNESIUM: CPT

## 2019-05-23 PROCEDURE — 92960 CARDIOVERSION ELECTRIC EXT: CPT | Performed by: INTERNAL MEDICINE

## 2019-05-23 PROCEDURE — 6370000000 HC RX 637 (ALT 250 FOR IP): Performed by: INTERNAL MEDICINE

## 2019-05-23 PROCEDURE — 84100 ASSAY OF PHOSPHORUS: CPT

## 2019-05-23 PROCEDURE — 85610 PROTHROMBIN TIME: CPT

## 2019-05-23 PROCEDURE — 93325 DOPPLER ECHO COLOR FLOW MAPG: CPT | Performed by: INTERNAL MEDICINE

## 2019-05-23 PROCEDURE — 85027 COMPLETE CBC AUTOMATED: CPT

## 2019-05-23 PROCEDURE — 93312 ECHO TRANSESOPHAGEAL: CPT

## 2019-05-23 PROCEDURE — 3700000000 HC ANESTHESIA ATTENDED CARE

## 2019-05-23 PROCEDURE — 5A2204Z RESTORATION OF CARDIAC RHYTHM, SINGLE: ICD-10-PCS | Performed by: INTERNAL MEDICINE

## 2019-05-23 PROCEDURE — 93312 ECHO TRANSESOPHAGEAL: CPT | Performed by: INTERNAL MEDICINE

## 2019-05-23 PROCEDURE — 3700000001 HC ADD 15 MINUTES (ANESTHESIA)

## 2019-05-23 PROCEDURE — 2580000003 HC RX 258: Performed by: NURSE ANESTHETIST, CERTIFIED REGISTERED

## 2019-05-23 PROCEDURE — 6360000002 HC RX W HCPCS: Performed by: NURSE ANESTHETIST, CERTIFIED REGISTERED

## 2019-05-23 PROCEDURE — 7100000000 HC PACU RECOVERY - FIRST 15 MIN

## 2019-05-23 PROCEDURE — 93010 ELECTROCARDIOGRAM REPORT: CPT | Performed by: INTERNAL MEDICINE

## 2019-05-23 RX ORDER — DOFETILIDE 0.5 MG/1
500 CAPSULE ORAL ONCE
Status: COMPLETED | OUTPATIENT
Start: 2019-05-23 | End: 2019-05-23

## 2019-05-23 RX ORDER — MAGNESIUM SULFATE IN WATER 40 MG/ML
2 INJECTION, SOLUTION INTRAVENOUS PRN
Status: DISCONTINUED | OUTPATIENT
Start: 2019-05-23 | End: 2019-05-26 | Stop reason: HOSPADM

## 2019-05-23 RX ORDER — SODIUM CHLORIDE 9 MG/ML
INJECTION, SOLUTION INTRAVENOUS CONTINUOUS PRN
Status: DISCONTINUED | OUTPATIENT
Start: 2019-05-23 | End: 2019-05-23 | Stop reason: SDUPTHER

## 2019-05-23 RX ORDER — AMIODARONE HYDROCHLORIDE 200 MG/1
200 TABLET ORAL DAILY
Status: ON HOLD | COMMUNITY
End: 2019-05-26 | Stop reason: HOSPADM

## 2019-05-23 RX ORDER — METOPROLOL SUCCINATE 50 MG/1
50 TABLET, EXTENDED RELEASE ORAL DAILY
Status: DISCONTINUED | OUTPATIENT
Start: 2019-05-23 | End: 2019-05-26 | Stop reason: HOSPADM

## 2019-05-23 RX ORDER — PROPOFOL 10 MG/ML
INJECTION, EMULSION INTRAVENOUS PRN
Status: DISCONTINUED | OUTPATIENT
Start: 2019-05-23 | End: 2019-05-23 | Stop reason: SDUPTHER

## 2019-05-23 RX ORDER — ACETAMINOPHEN 325 MG/1
650 TABLET ORAL EVERY 4 HOURS PRN
Status: DISCONTINUED | OUTPATIENT
Start: 2019-05-23 | End: 2019-05-26 | Stop reason: HOSPADM

## 2019-05-23 RX ADMIN — ACETAMINOPHEN 650 MG: 325 TABLET ORAL at 23:16

## 2019-05-23 RX ADMIN — DOFETILIDE 500 MCG: 0.5 CAPSULE ORAL at 14:07

## 2019-05-23 RX ADMIN — METOPROLOL SUCCINATE 50 MG: 50 TABLET, EXTENDED RELEASE ORAL at 16:49

## 2019-05-23 RX ADMIN — RIVAROXABAN 20 MG: 20 TABLET, FILM COATED ORAL at 16:49

## 2019-05-23 RX ADMIN — PROPOFOL 50 MG: 10 INJECTION, EMULSION INTRAVENOUS at 13:39

## 2019-05-23 RX ADMIN — SODIUM CHLORIDE: 9 INJECTION, SOLUTION INTRAVENOUS at 13:31

## 2019-05-23 RX ADMIN — MELATONIN TAB 3 MG 10 MG: 3 TAB at 23:16

## 2019-05-23 RX ADMIN — PROPOFOL 30 MG: 10 INJECTION, EMULSION INTRAVENOUS at 13:41

## 2019-05-23 RX ADMIN — PROPOFOL 100 MG: 10 INJECTION, EMULSION INTRAVENOUS at 13:36

## 2019-05-23 ASSESSMENT — PAIN SCALES - GENERAL: PAINLEVEL_OUTOF10: 5

## 2019-05-23 NOTE — ANESTHESIA POSTPROCEDURE EVALUATION
Department of Anesthesiology  Postprocedure Note    Patient: Frankie Pillai  MRN: [id number]  YOB: 1970  Date of evaluation: 5/23/2019  Time:  1:51 PM     Procedure Summary     Date:  05/23/19 Room / Location:  [address] [place] [address] Cath Lab    Anesthesia Start:  8372 Anesthesia Stop:  6111    Procedure:  CATH LAB TRANS ESOPHAGEAL-CARMENZA Diagnosis:  Visit for monitoring Tikosyn therapy    Scheduled Providers:   Responsible Provider:  MATTIE Brown CRNA    Anesthesia Type:  MAC, TIVA ASA Status:  3          Anesthesia Type: MAC, TIVA    Michaela Phase I:      Michaela Phase II:      Last vitals: Reviewed and per EMR flowsheets.        Anesthesia Post Evaluation    Patient location during evaluation: bedside  Patient participation: complete - patient participated  Level of consciousness: awake and alert  Pain score: 0  Airway patency: patent  Nausea & Vomiting: no vomiting and no nausea  Complications: no  Cardiovascular status: blood pressure returned to baseline and hemodynamically stable  Respiratory status: acceptable, room air, spontaneous ventilation and nonlabored ventilation  Hydration status: stable

## 2019-05-23 NOTE — PROCEDURES
[facility]     Electrophysiology Procedure Note       Date of Procedure: 5/23/2019  Patient's Name: Braxton London  YOB: 1970   Medical Record Number: [id number]    Procedure Performed by: Saran Reynoso MD    Sedation: Anesthesia    Procedures performed:    Trans-esophageal echocardiography  External Electrical cardioversion    Indication of the procedure: atrial tachycardia    Braxton London is a 44 y.o. male with atrial tachycardia    Details of procedure: The patient was brought to the cath lab area in a fasting and non-sedated state. The risks, benefits and alternatives of the procedure were discussed with the patient. The patient opted to proceed with the procedure. Written informed consent was signed and placed in the chart. A timeout protocol was completed to identify the patient and the procedure being performed. Sedation per anesthesia and CARMENZA was performed which did not show any AMOL/LA clot/thrombus. Full CARMENZA reports will be dictated. After confirming sedation and electrical DC cardioversion was perfomred using 50J and then 200J, synchronized shock. Patient was converted to sinus rhythm. The patient tolerated the procedure well and there were no complications. Conclusion:   Successful external DC cardioversion of atrial TACHYCARDIA. Plan:   ADMIT FOR TIKOSYN THERAPY  QTC rechecked by me on EKG and calculated by me and was less than 440ms.

## 2019-05-23 NOTE — ANESTHESIA PRE PROCEDURE
Arthritis     right shoulder    H/O cardiovascular stress test 12/12/2014    cardiolite-normal, no ischemia    H/O echocardiogram 4/13/15    EF 50-55% Mildly dilated left atrium. Borderline sized right ventricle. Trace MR & TR. Past Surgical History:        Procedure Laterality Date    ACHILLES TENDON SURGERY  2005    repair torn Jcarlos's tendon    BACK SURGERY      CARDIOVERSION  11/17/14    HERNIA REPAIR      OTHER SURGICAL HISTORY  3/10/15    cardiac ablation for a fib/ a flutter    SHOULDER SURGERY  2010    right shoulder rotator cuff surgery       Social History:    Social History     Tobacco Use    Smoking status: Never Smoker    Smokeless tobacco: Current User     Types: Chew    Tobacco comment: Chews tobacco   Reviewed 2/10/16   Substance Use Topics    Alcohol use: Yes     Comment: only occasionally                                Ready to quit: Not Answered  Counseling given: Not Answered  Comment: Chews tobacco   Reviewed 2/10/16      Vital Signs (Current): There were no vitals filed for this visit.                                            BP Readings from Last 3 Encounters:   05/23/19 (!) 124/95   05/22/19 136/84   02/14/19 130/70       NPO Status:                                                                                 BMI:   Wt Readings from Last 3 Encounters:   05/23/19 215 lb (97.5 kg)   05/22/19 215 lb (97.5 kg)   02/14/19 226 lb 3.2 oz (102.6 kg)     There is no height or weight on file to calculate BMI.    CBC:   Lab Results   Component Value Date    WBC 6.8 05/23/2019    RBC 5.19 05/23/2019    HGB 15.7 05/23/2019    HCT 47.5 05/23/2019    MCV 91.5 05/23/2019    RDW 12.3 05/23/2019     05/23/2019       CMP:   Lab Results   Component Value Date     05/23/2019    K 4.9 05/23/2019     05/23/2019    CO2 24 05/23/2019    BUN 20 05/23/2019    CREATININE 1.0 05/23/2019    GFRAA >60 05/23/2019    LABGLOM >60 05/23/2019    GLUCOSE 107 05/23/2019    PROT 6.4 07/11/2018    CALCIUM 9.1 05/23/2019    BILITOT 0.6 07/11/2018    ALKPHOS 81 07/11/2018    AST 22 07/11/2018    ALT 37 07/11/2018       POC Tests: No results for input(s): POCGLU, POCNA, POCK, POCCL, POCBUN, POCHEMO, POCHCT in the last 72 hours. Coags:   Lab Results   Component Value Date    PROTIME 20.8 05/23/2019    INR 1.80 05/23/2019    APTT 50.9 02/07/2019       HCG (If Applicable): No results found for: PREGTESTUR, PREGSERUM, HCG, HCGQUANT     ABGs:   Lab Results   Component Value Date    PO2ART 255.8 01/15/2019    VNS2RML 39.6 01/15/2019    DJH6BRB 25.4 01/15/2019        Type & Screen (If Applicable):  No results found for: LABABO, [address]    Anesthesia Evaluation  Patient summary reviewed and Nursing notes reviewed  Airway: Mallampati: II  TM distance: >3 FB   Neck ROM: full  Mouth opening: > = 3 FB Dental:          Pulmonary:Negative Pulmonary ROS and normal exam                               Cardiovascular:  Exercise tolerance: good (>4 METS),   (+) dysrhythmias: atrial flutter,         Rhythm: irregular                   ROS comment: Echo 7/2018    Summary   Left ventricular function is normal , EF is estimated at 50-55%.   Mild concentric left ventricular hypertrophy.   Grade II diastolic dysfunction.   Mildly dilated left atrium.   Right ventricular systolic pressure of 23 mm Hg.   Mild mitral regurgitation is present.   No evidence of any pericardial effusion. Neuro/Psych:   (+) headaches:,             GI/Hepatic/Renal:   (+) GERD:,           Endo/Other: Negative Endo/Other ROS   (+) blood dyscrasia: anticoagulation therapy:., .                 Abdominal:   (+) obese,         Vascular: negative vascular ROS. Anesthesia Plan      MAC and TIVA     ASA 3       Induction: intravenous. Anesthetic plan and risks discussed with patient. Plan discussed with CRNA and attending.                   MATTIE Diego - CRNA   5/23/2019

## 2019-05-23 NOTE — CARE COORDINATION
.CM has reviewed pt's chart for needs. CM screening shows that pt has PCP, insurance and is independent PTA. If needs arise please contact SHANIKA.   TE

## 2019-05-23 NOTE — LETTER
[facility] [address]  Phone: [phone]             May 26, 2019    Patient: Nathan Rogers   YOB: 1970   Date of Visit: 5/23/2019       To Whom It May Concern:    Ryan Pritchard was seen and treated in our facility beginning 5/23/2019 until 5/26/2019. He may return to work on 5/28/2019. He can be back to work at 100% capacity and operating equipment.       Sincerely,       Jerrod Pinto RN         Signature:__________________________________

## 2019-05-23 NOTE — PROGRESS NOTES
Skin assessment complete with DUSTIN Cedeno. No skin issues noted at this time.   Tyler Ascencio RN   3:34 PM  5/23/2019

## 2019-05-23 NOTE — H&P
edema. Pt wants to discuss some of his medications with you. Over all he feels lot better  He reports arthralgia at times - question of medication induced  Metoprolol makes him tired for some time after he takes it. Previous visit: (7/10/2015)      Chief Complaint   Patient presents with    Check-Up     2 month follow up. Denies dizziness, edema, and palpitations. He hasn't felt like he has been in A-Fib since having the ablation. Had great energies and was able to work with out any problem till he has come down with this URI       Chest Congestion     Patient states that for the last 3 weeks he has been feeling a congestion in his chest. He says it feels like he might be getting pneumonia. When he lays down it feels like there is a weight on his chest. He says he also feels achy in his joints. He was too scared to take anything that might cause him to go back into A-Fib. Sputum - yellowish      Shortness of Breath     Patient states that he feels short of breath with stairs only this started with URI symptoms. Denies chest pain,  palpitations, syncope or presyncope, edema        Previous visit: 2/20/2015      Chief Complaint   Patient presents with    Fatigue     Patient had cardioversion on 01/23/15. He states he did okay until last 2 days he started feeling tired, he went to PCP who did EKG and told him that he was having flutter. Patient states that he has been having some chest pain and sweating when his heart rate is fast. After taking digoxin he felt better      called me from his clinic. I recommended to start on low dose digoxin as patient did not want to go to hospital for rapid ventricular rate. I arranged an appointment today so that I can assess patient. Patient feels okay now but tired. Has low energy and after work feels very drained. Previous visit:    Chief Complaint   Patient presents with    Fatigue     Patient here for 3 week follow up with EKG.  Patient states that he feels tired all of the time. He states that he had labs with PCP on 01/16/15 and again on yesterday. Patient is here to discuss possible cardioversion. Tired all the time    Patient had an LA clot when we performed CARMENZA since then on anticoagulation - 4 weeks    Chest tightness off and on - with palpitations  Shortness of breath Yes with palpitations  Palpitations Yes twice an hour  Syncope or Presyncope No  Edema No  Claudication No        Past Medical History:   Diagnosis Date    Arrhythmia 11/2014    Atrial fib    Arthritis     right shoulder    H/O cardiovascular stress test 12/12/2014    cardiolite-normal, no ischemia    H/O echocardiogram 4/13/15    EF 50-55% Mildly dilated left atrium. Borderline sized right ventricle. Trace MR & TR. Past Surgical History:   Procedure Laterality Date    ACHILLES TENDON SURGERY  2005    repair torn Jcarlos's tendon    BACK SURGERY      CARDIOVERSION  11/17/14    HERNIA REPAIR      OTHER SURGICAL HISTORY  3/10/15    cardiac ablation for a fib/ a flutter    SHOULDER SURGERY  2010    right shoulder rotator cuff surgery       Family History   Problem Relation Age of Onset    Cancer Father     Diabetes Father         reports that he has never smoked. His smokeless tobacco use includes chew. He reports that he drinks alcohol. He reports that he does not use drugs. Allergies   Allergen Reactions    Codeine Nausea Only    Pcn [Penicillins] Rash    Sulfa Antibiotics Rash       Medications:    Amiodarone which patient stopped  Metoprolol  ASpirin xarelto        Review of Systems:   Review of Systems   Constitutional:  Tired and weak. Negative for fever, chills and activity change. HENT: Negative for congestion, ear pain and tinnitus. Eyes: Negative for photophobia, pain and visual disturbance. Respiratory:  shortness of breath Negative for cough, chest tightness and wheezing.     Cardiovascular:  chest pain and palpitations Negative for leg swelling. Gastrointestinal: Negative for nausea, vomiting, abdominal pain, diarrhea, constipation and blood in stool. Endocrine: Negative for cold intolerance and heat intolerance. Genitourinary: Negative for dysuria, hematuria and flank pain. Musculoskeletal: Negative for myalgias, and neck stiffness. Arthralgias  Skin: Negative for color change and rash. Allergic/Immunologic: Negative for food allergies. Neurological:  Negative for dizziness, light-headedness, numbness and headaches. Hematological: Does not bruise/bleed easily. Psychiatric/Behavioral: Negative for behavioral problems, confusion and agitation. Physical Examination:    BP (!) 124/95   Pulse 130   Temp 97 °F (36.1 °C) (Temporal)   Resp 19   Ht 5' 10\" (1.778 m)   Wt 215 lb (97.5 kg)   SpO2 100%   BMI 30.85 kg/m²    Wt Readings from Last 3 Encounters:   05/23/19 215 lb (97.5 kg)   05/22/19 215 lb (97.5 kg)   02/14/19 226 lb 3.2 oz (102.6 kg)     Body mass index is 30.85 kg/m². Physical Exam   Constitutional: He is oriented to person, place, and time and well-developed, well-nourished, and in no distress. HENT:   Head: Normocephalic and atraumatic. Eyes: Conjunctivae and EOM are normal. Pupils are equal, round, and reactive to light. Right eye exhibits no discharge. Neck: Normal range of motion. No JVD present. No thyromegaly present. Cardiovascular: Regular rhythm and  tachycardic. Exam reveals no friction rub. No murmur heard. Pulmonary/Chest: Effort normal and breath sounds normal. No stridor. No respiratory distress. He has no wheezes. Abdominal: Soft. Bowel sounds are normal. He exhibits no distension. There is no tenderness. Musculoskeletal: Normal range of motion. He exhibits no edema or tenderness. Neurological: He is alert and oriented to person, place, and time. He displays normal reflexes. No cranial nerve deficit. Coordination normal.   Skin: Skin is warm and dry. No rash noted. No erythema. Psychiatric: Mood and affect normal.           CBC:   Lab Results   Component Value Date    WBC 6.8 05/23/2019    HGB 15.7 05/23/2019    HCT 47.5 05/23/2019     05/23/2019     Lipids:   Lab Results   Component Value Date    CHOL 151 07/12/2018     PT/INR:   No components found for: PT,  INR     BMP:    Lab Results   Component Value Date     05/23/2019    K 4.9 05/23/2019     05/23/2019    CO2 24 05/23/2019    BUN 20 05/23/2019     CMP:   Lab Results   Component Value Date    AST 22 07/11/2018    PROT 6.4 07/11/2018    BILITOT 0.6 07/11/2018    ALKPHOS 81 07/11/2018     TSH:    No results found for: TSH    EKGINTERPRETATION - EKG Interpretation:  Atrial tachycardiar with RVR      IMPRESSION / RECOMMENDATIONS:     * Atrial tachycardia   Atrial fibrillation sp ablation  Atrial flutter sp ablation  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 Patient atrial tachycardia was terminated during ablation and multiple tachycardia - post ablation we could not induce tachycardia post ablation     Did not have any arrhythmia post ablation    Could be another source    Will cardiovert tomorrow and attempt tikosyn    Amiodarone was stopped by patient    Patient not reports he is drinking beer and caffeine  Recommended to quit. He reports that his brother who had ablation by me had been doing fine and he is having alcohol so he started too.  I recommended to avoid alcohol completely      Jessica Orr MD

## 2019-05-24 LAB
ALBUMIN SERPL-MCNC: 3.9 GM/DL (ref 3.4–5)
ANION GAP SERPL CALCULATED.3IONS-SCNC: 13 MMOL/L (ref 4–16)
BUN BLDV-MCNC: 23 MG/DL (ref 6–23)
CALCIUM SERPL-MCNC: 8.9 MG/DL (ref 8.3–10.6)
CHLORIDE BLD-SCNC: 107 MMOL/L (ref 99–110)
CO2: 20 MMOL/L (ref 21–32)
CREAT SERPL-MCNC: 0.9 MG/DL (ref 0.9–1.3)
EKG ATRIAL RATE: 64 BPM
EKG ATRIAL RATE: 75 BPM
EKG DIAGNOSIS: NORMAL
EKG DIAGNOSIS: NORMAL
EKG P AXIS: 59 DEGREES
EKG P AXIS: 62 DEGREES
EKG P-R INTERVAL: 160 MS
EKG P-R INTERVAL: 186 MS
EKG Q-T INTERVAL: 420 MS
EKG Q-T INTERVAL: 430 MS
EKG QRS DURATION: 106 MS
EKG QRS DURATION: 106 MS
EKG QTC CALCULATION (BAZETT): 443 MS
EKG QTC CALCULATION (BAZETT): 469 MS
EKG R AXIS: -28 DEGREES
EKG R AXIS: -9 DEGREES
EKG T AXIS: -1 DEGREES
EKG T AXIS: 4 DEGREES
EKG VENTRICULAR RATE: 64 BPM
EKG VENTRICULAR RATE: 75 BPM
GFR AFRICAN AMERICAN: >60 ML/MIN/1.73M2
GFR NON-AFRICAN AMERICAN: >60 ML/MIN/1.73M2
GLUCOSE BLD-MCNC: 101 MG/DL (ref 70–99)
HCT VFR BLD CALC: 44.6 % (ref 42–52)
HEMOGLOBIN: 13.9 GM/DL (ref 13.5–18)
MAGNESIUM: 2.2 MG/DL (ref 1.8–2.4)
MCH RBC QN AUTO: 30.8 PG (ref 27–31)
MCHC RBC AUTO-ENTMCNC: 31.2 % (ref 32–36)
MCV RBC AUTO: 98.7 FL (ref 78–100)
PDW BLD-RTO: 12.4 % (ref 11.7–14.9)
PHOSPHORUS: 3.8 MG/DL (ref 2.5–4.9)
PLATELET # BLD: 202 K/CU MM (ref 140–440)
PMV BLD AUTO: 9.7 FL (ref 7.5–11.1)
POTASSIUM SERPL-SCNC: 4.5 MMOL/L (ref 3.5–5.1)
RBC # BLD: 4.52 M/CU MM (ref 4.6–6.2)
SODIUM BLD-SCNC: 140 MMOL/L (ref 135–145)
WBC # BLD: 7.3 K/CU MM (ref 4–10.5)

## 2019-05-24 PROCEDURE — 93005 ELECTROCARDIOGRAM TRACING: CPT | Performed by: INTERNAL MEDICINE

## 2019-05-24 PROCEDURE — 85027 COMPLETE CBC AUTOMATED: CPT

## 2019-05-24 PROCEDURE — 83735 ASSAY OF MAGNESIUM: CPT

## 2019-05-24 PROCEDURE — 99232 SBSQ HOSP IP/OBS MODERATE 35: CPT | Performed by: NURSE PRACTITIONER

## 2019-05-24 PROCEDURE — 2140000000 HC CCU INTERMEDIATE R&B

## 2019-05-24 PROCEDURE — 36415 COLL VENOUS BLD VENIPUNCTURE: CPT

## 2019-05-24 PROCEDURE — 93010 ELECTROCARDIOGRAM REPORT: CPT | Performed by: INTERNAL MEDICINE

## 2019-05-24 PROCEDURE — 80069 RENAL FUNCTION PANEL: CPT

## 2019-05-24 PROCEDURE — 6370000000 HC RX 637 (ALT 250 FOR IP): Performed by: INTERNAL MEDICINE

## 2019-05-24 RX ORDER — DILTIAZEM HYDROCHLORIDE 120 MG/1
CAPSULE, COATED, EXTENDED RELEASE ORAL
Qty: 30 CAPSULE | Refills: 3 | OUTPATIENT
Start: 2019-05-24

## 2019-05-24 RX ORDER — DOFETILIDE 0.25 MG/1
250 CAPSULE ORAL EVERY 12 HOURS SCHEDULED
Status: DISCONTINUED | OUTPATIENT
Start: 2019-05-24 | End: 2019-05-26 | Stop reason: HOSPADM

## 2019-05-24 RX ADMIN — RIVAROXABAN 20 MG: 20 TABLET, FILM COATED ORAL at 16:50

## 2019-05-24 RX ADMIN — DOFETILIDE 250 MCG: 0.25 CAPSULE ORAL at 10:42

## 2019-05-24 RX ADMIN — MELATONIN TAB 3 MG 10 MG: 3 TAB at 22:52

## 2019-05-24 RX ADMIN — ACETAMINOPHEN 650 MG: 325 TABLET ORAL at 22:52

## 2019-05-24 RX ADMIN — DOFETILIDE 250 MCG: 0.25 CAPSULE ORAL at 22:48

## 2019-05-24 RX ADMIN — METOPROLOL SUCCINATE 50 MG: 50 TABLET, EXTENDED RELEASE ORAL at 10:09

## 2019-05-24 ASSESSMENT — PAIN SCALES - GENERAL
PAINLEVEL_OUTOF10: 0
PAINLEVEL_OUTOF10: 3
PAINLEVEL_OUTOF10: 0

## 2019-05-24 ASSESSMENT — PAIN DESCRIPTION - PAIN TYPE: TYPE: ACUTE PAIN

## 2019-05-24 ASSESSMENT — PAIN DESCRIPTION - FREQUENCY: FREQUENCY: CONTINUOUS

## 2019-05-24 ASSESSMENT — PAIN DESCRIPTION - DESCRIPTORS: DESCRIPTORS: ACHING

## 2019-05-24 ASSESSMENT — PAIN DESCRIPTION - LOCATION: LOCATION: HEAD

## 2019-05-24 NOTE — PROGRESS NOTES
Admit Date:  5/23/2019    Admission diagnosis / Complaint : atrial tachycardia      Subjective:  Mr. Varun Valdez denies complaints of shortness of breath, dizziness, syncope, palpitations. Objective:   /83   Pulse 67   Temp 98.1 °F (36.7 °C) (Oral)   Resp 14   Ht 5' 10\" (1.778 m)   Wt 214 lb 4.8 oz (97.2 kg)   SpO2 99%   BMI 30.75 kg/m²   No intake or output data in the 24 hours ending 05/24/19 1448    TELEMETRY: Tracy    has a past medical history of Arrhythmia, Arthritis, H/O cardiovascular stress test, and H/O echocardiogram.   has a past surgical history that includes back surgery; hernia repair; shoulder surgery (2010); Achilles tendon surgery (2005); Cardioversion (11/17/14); and other surgical history (3/10/15). Physical Exam:  General:  Awake, alert, NAD  Skin:  Warm and dry.  Multiple tattoos noted to right arm  Neck:  JVD not present  Chest:  Clear to auscultation, respiration easy  Cardiovascular:  RRR S1S2  Abdomen:  Soft nontender  Extremities:  No edema    Medications:    dofetilide  250 mcg Oral 2 times per day    rivaroxaban  20 mg Oral Dinner    metoprolol succinate  50 mg Oral Daily       magnesium sulfate, acetaminophen, melatonin    Lab Data:  CBC:   Recent Labs     05/23/19  1220 05/24/19  0510   WBC 6.8 7.3   HGB 15.7 13.9   HCT 47.5 44.6   MCV 91.5 98.7    202     BMP:   Recent Labs     05/23/19  1220 05/24/19  0510    140   K 4.9 4.5    107   CO2 24 20*   PHOS 2.8 3.8   BUN 20 23   CREATININE 1.0 0.9     LIVER PROFILE:   PT/INR:   Recent Labs     05/23/19  1220   PROTIME 20.8*   INR 1.80     APTT:   BNP:   TROPONIN:       Assessment and Plan:    Tikosyn monitoring   S/P Cardioversion of atrial tachycardia  Atrial Tachycardia  Atrial Fibrillation S/P ablation  Atrial Flutter S/P ablation  History of tachycardia induced cardiomyopathy    QTc within range  EKG to be obtained 3 hours after each scheduled dose of Tikosyn is to be given  QTc result to be called to the physician  Tikosyn will be dosed according to the QTc  Patient will require 72 hour admission  Expected date of discharge is Sunday  Discussed with patient that he needs to watched 72hrs total  Patient needs to maintain compliance  Avoid alcohol and caffeine    SONDRA Irene 5/24/2019 2:48 PM     I have seen and examined this patient personally, independently of the nurse practitioner. I have reviewed the hospital care given to date and reviewed all pertinent labs and imaging. The plan was developed mutually at the time of the visit with the patient, Nurse practioner (NP)  and myself. I have spoken with patient, nursing staff and provided written and verbal instructions. The above note has minnie reviewed and I agree with the history, physical examination and treatment plan. Necessary changes to the note has been made in history, physical examination and plan to the above note.     Vik Wasserman M.D 05/24/19

## 2019-05-25 LAB
ALBUMIN SERPL-MCNC: 4.2 GM/DL (ref 3.4–5)
ANION GAP SERPL CALCULATED.3IONS-SCNC: 12 MMOL/L (ref 4–16)
BUN BLDV-MCNC: 19 MG/DL (ref 6–23)
CALCIUM SERPL-MCNC: 9.2 MG/DL (ref 8.3–10.6)
CHLORIDE BLD-SCNC: 102 MMOL/L (ref 99–110)
CO2: 23 MMOL/L (ref 21–32)
CREAT SERPL-MCNC: 0.9 MG/DL (ref 0.9–1.3)
EKG ATRIAL RATE: 57 BPM
EKG ATRIAL RATE: 59 BPM
EKG ATRIAL RATE: 65 BPM
EKG DIAGNOSIS: NORMAL
EKG P AXIS: 52 DEGREES
EKG P AXIS: 66 DEGREES
EKG P AXIS: 68 DEGREES
EKG P-R INTERVAL: 180 MS
EKG P-R INTERVAL: 186 MS
EKG P-R INTERVAL: 190 MS
EKG Q-T INTERVAL: 438 MS
EKG Q-T INTERVAL: 444 MS
EKG Q-T INTERVAL: 476 MS
EKG QRS DURATION: 102 MS
EKG QRS DURATION: 108 MS
EKG QRS DURATION: 110 MS
EKG QTC CALCULATION (BAZETT): 432 MS
EKG QTC CALCULATION (BAZETT): 455 MS
EKG QTC CALCULATION (BAZETT): 471 MS
EKG R AXIS: -1 DEGREES
EKG R AXIS: -11 DEGREES
EKG R AXIS: -16 DEGREES
EKG T AXIS: 1 DEGREES
EKG T AXIS: 6 DEGREES
EKG T AXIS: 6 DEGREES
EKG VENTRICULAR RATE: 57 BPM
EKG VENTRICULAR RATE: 59 BPM
EKG VENTRICULAR RATE: 65 BPM
GFR AFRICAN AMERICAN: >60 ML/MIN/1.73M2
GFR NON-AFRICAN AMERICAN: >60 ML/MIN/1.73M2
GLUCOSE BLD-MCNC: 100 MG/DL (ref 70–99)
HCT VFR BLD CALC: 43.6 % (ref 42–52)
HEMOGLOBIN: 14.1 GM/DL (ref 13.5–18)
MAGNESIUM: 2 MG/DL (ref 1.8–2.4)
MCH RBC QN AUTO: 30.3 PG (ref 27–31)
MCHC RBC AUTO-ENTMCNC: 32.3 % (ref 32–36)
MCV RBC AUTO: 93.6 FL (ref 78–100)
PDW BLD-RTO: 12.2 % (ref 11.7–14.9)
PHOSPHORUS: 4 MG/DL (ref 2.5–4.9)
PLATELET # BLD: 213 K/CU MM (ref 140–440)
PMV BLD AUTO: 9.6 FL (ref 7.5–11.1)
POTASSIUM SERPL-SCNC: 4.2 MMOL/L (ref 3.5–5.1)
RBC # BLD: 4.66 M/CU MM (ref 4.6–6.2)
SODIUM BLD-SCNC: 137 MMOL/L (ref 135–145)
WBC # BLD: 6.4 K/CU MM (ref 4–10.5)

## 2019-05-25 PROCEDURE — 93005 ELECTROCARDIOGRAM TRACING: CPT | Performed by: INTERNAL MEDICINE

## 2019-05-25 PROCEDURE — 99231 SBSQ HOSP IP/OBS SF/LOW 25: CPT | Performed by: INTERNAL MEDICINE

## 2019-05-25 PROCEDURE — 80069 RENAL FUNCTION PANEL: CPT

## 2019-05-25 PROCEDURE — 2140000000 HC CCU INTERMEDIATE R&B

## 2019-05-25 PROCEDURE — 83735 ASSAY OF MAGNESIUM: CPT

## 2019-05-25 PROCEDURE — 85027 COMPLETE CBC AUTOMATED: CPT

## 2019-05-25 PROCEDURE — 36415 COLL VENOUS BLD VENIPUNCTURE: CPT

## 2019-05-25 PROCEDURE — 6370000000 HC RX 637 (ALT 250 FOR IP): Performed by: INTERNAL MEDICINE

## 2019-05-25 PROCEDURE — 93010 ELECTROCARDIOGRAM REPORT: CPT | Performed by: INTERNAL MEDICINE

## 2019-05-25 PROCEDURE — 84100 ASSAY OF PHOSPHORUS: CPT

## 2019-05-25 RX ADMIN — MELATONIN TAB 3 MG 10 MG: 3 TAB at 22:12

## 2019-05-25 RX ADMIN — RIVAROXABAN 20 MG: 20 TABLET, FILM COATED ORAL at 17:33

## 2019-05-25 RX ADMIN — ACETAMINOPHEN 650 MG: 325 TABLET ORAL at 22:13

## 2019-05-25 RX ADMIN — DOFETILIDE 250 MCG: 0.25 CAPSULE ORAL at 22:12

## 2019-05-25 RX ADMIN — METOPROLOL SUCCINATE 50 MG: 50 TABLET, EXTENDED RELEASE ORAL at 09:26

## 2019-05-25 RX ADMIN — DOFETILIDE 250 MCG: 0.25 CAPSULE ORAL at 09:27

## 2019-05-25 ASSESSMENT — PAIN SCALES - GENERAL
PAINLEVEL_OUTOF10: 0
PAINLEVEL_OUTOF10: 3
PAINLEVEL_OUTOF10: 3

## 2019-05-25 NOTE — PROGRESS NOTES
tenderness, Supple, No stridor. Eyes:  PERRL, EOMI, Conjunctiva normal, No discharge. Respiratory:  Normal breath sounds, No respiratory distress, No wheezing, No chest tenderness. Cardiovascular:  Normal heart rate, Normal rhythm, No murmurs, No rubs, No gallops, JVP not elevated  Abdomen/GI:  Bowel sounds normal, Soft, No tenderness, No masses, No pulsatile masses. Musculoskeletal:  Intact distal pulses, No edema, No tenderness, No cyanosis, No clubbing. Good range of motion in all major joints. No tenderness to palpation or major deformities noted. Back- No tenderness. Integument:  Warm, Dry, No erythema, No rash. Lymphatic:  No lymphadenopathy noted. Neurologic:  Alert & oriented x 3, Normal motor function, Normal sensory function, No focal deficits noted. Psychiatric:  Affect  and  Mood :no change    Medications:    dofetilide  250 mcg Oral 2 times per day    rivaroxaban  20 mg Oral Dinner    metoprolol succinate  50 mg Oral Daily       magnesium sulfate, acetaminophen, melatonin    Lab Data:  CBC:   Recent Labs     05/23/19  1220 05/24/19  0510 05/25/19  0330   WBC 6.8 7.3 6.4   HGB 15.7 13.9 14.1   HCT 47.5 44.6 43.6   MCV 91.5 98.7 93.6    202 213     BMP:   Recent Labs     05/23/19  1220 05/24/19  0510 05/25/19  0330    140 137   K 4.9 4.5 4.2    107 102   CO2 24 20* 23   PHOS 2.8 3.8 4.0   BUN 20 23 19   CREATININE 1.0 0.9 0.9     PT/INR:   Recent Labs     05/23/19  1220   PROTIME 20.8*   INR 1.80     BNP:  No results for input(s): PROBNP in the last 72 hours. TROPONIN: No results for input(s): TROPONINT in the last 72 hours. ECHO :   echocardiogram    Assessment:  44 y. o.year old who is admitted forNo chief complaint on file. , active issues as noted below:  Impression:  Active Problems:    Visit for monitoring Tikosyn therapy  Resolved Problems:    * No resolved hospital problems.  *            All labs, medications and tests reviewed by myself , continue all

## 2019-05-26 VITALS
SYSTOLIC BLOOD PRESSURE: 113 MMHG | HEART RATE: 62 BPM | TEMPERATURE: 98.2 F | DIASTOLIC BLOOD PRESSURE: 68 MMHG | OXYGEN SATURATION: 100 % | HEIGHT: 70 IN | RESPIRATION RATE: 15 BRPM | BODY MASS INDEX: 30.28 KG/M2 | WEIGHT: 211.5 LBS

## 2019-05-26 LAB
ALBUMIN SERPL-MCNC: 4.2 GM/DL (ref 3.4–5)
ANION GAP SERPL CALCULATED.3IONS-SCNC: 14 MMOL/L (ref 4–16)
BUN BLDV-MCNC: 16 MG/DL (ref 6–23)
CALCIUM SERPL-MCNC: 9.4 MG/DL (ref 8.3–10.6)
CHLORIDE BLD-SCNC: 102 MMOL/L (ref 99–110)
CO2: 24 MMOL/L (ref 21–32)
CREAT SERPL-MCNC: 0.9 MG/DL (ref 0.9–1.3)
EKG ATRIAL RATE: 56 BPM
EKG ATRIAL RATE: 64 BPM
EKG DIAGNOSIS: NORMAL
EKG DIAGNOSIS: NORMAL
EKG P AXIS: 50 DEGREES
EKG P AXIS: 57 DEGREES
EKG P-R INTERVAL: 178 MS
EKG P-R INTERVAL: 182 MS
EKG Q-T INTERVAL: 432 MS
EKG Q-T INTERVAL: 446 MS
EKG QRS DURATION: 104 MS
EKG QRS DURATION: 106 MS
EKG QTC CALCULATION (BAZETT): 430 MS
EKG QTC CALCULATION (BAZETT): 445 MS
EKG R AXIS: -14 DEGREES
EKG R AXIS: -18 DEGREES
EKG T AXIS: -11 DEGREES
EKG T AXIS: -2 DEGREES
EKG VENTRICULAR RATE: 56 BPM
EKG VENTRICULAR RATE: 64 BPM
GFR AFRICAN AMERICAN: >60 ML/MIN/1.73M2
GFR NON-AFRICAN AMERICAN: >60 ML/MIN/1.73M2
GLUCOSE BLD-MCNC: 99 MG/DL (ref 70–99)
HCT VFR BLD CALC: 44.7 % (ref 42–52)
HEMOGLOBIN: 14.7 GM/DL (ref 13.5–18)
MAGNESIUM: 2.1 MG/DL (ref 1.8–2.4)
MCH RBC QN AUTO: 30.3 PG (ref 27–31)
MCHC RBC AUTO-ENTMCNC: 32.9 % (ref 32–36)
MCV RBC AUTO: 92.2 FL (ref 78–100)
PDW BLD-RTO: 12.2 % (ref 11.7–14.9)
PHOSPHORUS: 4 MG/DL (ref 2.5–4.9)
PLATELET # BLD: 212 K/CU MM (ref 140–440)
PMV BLD AUTO: 9.8 FL (ref 7.5–11.1)
POTASSIUM SERPL-SCNC: 4.6 MMOL/L (ref 3.5–5.1)
RBC # BLD: 4.85 M/CU MM (ref 4.6–6.2)
SODIUM BLD-SCNC: 140 MMOL/L (ref 135–145)
WBC # BLD: 7.2 K/CU MM (ref 4–10.5)

## 2019-05-26 PROCEDURE — 6370000000 HC RX 637 (ALT 250 FOR IP): Performed by: INTERNAL MEDICINE

## 2019-05-26 PROCEDURE — 99238 HOSP IP/OBS DSCHRG MGMT 30/<: CPT | Performed by: INTERNAL MEDICINE

## 2019-05-26 PROCEDURE — 93005 ELECTROCARDIOGRAM TRACING: CPT | Performed by: INTERNAL MEDICINE

## 2019-05-26 PROCEDURE — 80069 RENAL FUNCTION PANEL: CPT

## 2019-05-26 PROCEDURE — 93010 ELECTROCARDIOGRAM REPORT: CPT | Performed by: INTERNAL MEDICINE

## 2019-05-26 PROCEDURE — 85027 COMPLETE CBC AUTOMATED: CPT

## 2019-05-26 PROCEDURE — 36415 COLL VENOUS BLD VENIPUNCTURE: CPT

## 2019-05-26 PROCEDURE — 83735 ASSAY OF MAGNESIUM: CPT

## 2019-05-26 RX ORDER — DOFETILIDE 0.25 MG/1
250 CAPSULE ORAL EVERY 12 HOURS SCHEDULED
Qty: 60 CAPSULE | Refills: 3 | Status: SHIPPED | OUTPATIENT
Start: 2019-05-26 | End: 2019-06-12 | Stop reason: SDUPTHER

## 2019-05-26 RX ORDER — METOPROLOL SUCCINATE 50 MG/1
50 TABLET, EXTENDED RELEASE ORAL DAILY
Qty: 30 TABLET | Refills: 3 | Status: SHIPPED | OUTPATIENT
Start: 2019-05-26 | End: 2019-06-12

## 2019-05-26 RX ADMIN — METOPROLOL SUCCINATE 50 MG: 50 TABLET, EXTENDED RELEASE ORAL at 07:38

## 2019-05-26 RX ADMIN — DOFETILIDE 250 MCG: 0.25 CAPSULE ORAL at 07:39

## 2019-06-04 ENCOUNTER — TELEPHONE (OUTPATIENT)
Dept: CARDIOLOGY CLINIC | Age: 49
End: 2019-06-04

## 2019-06-12 ENCOUNTER — OFFICE VISIT (OUTPATIENT)
Dept: CARDIOLOGY CLINIC | Age: 49
End: 2019-06-12
Payer: COMMERCIAL

## 2019-06-12 VITALS
HEART RATE: 62 BPM | WEIGHT: 215.8 LBS | HEIGHT: 70 IN | SYSTOLIC BLOOD PRESSURE: 118 MMHG | DIASTOLIC BLOOD PRESSURE: 70 MMHG | BODY MASS INDEX: 30.9 KG/M2

## 2019-06-12 DIAGNOSIS — Z51.81 VISIT FOR MONITORING TIKOSYN THERAPY: Primary | ICD-10-CM

## 2019-06-12 DIAGNOSIS — Z79.899 VISIT FOR MONITORING TIKOSYN THERAPY: Primary | ICD-10-CM

## 2019-06-12 DIAGNOSIS — I48.91 ATRIAL FIBRILLATION, UNSPECIFIED TYPE (HCC): ICD-10-CM

## 2019-06-12 PROCEDURE — 93000 ELECTROCARDIOGRAM COMPLETE: CPT | Performed by: INTERNAL MEDICINE

## 2019-06-12 PROCEDURE — 1111F DSCHRG MED/CURRENT MED MERGE: CPT | Performed by: NURSE PRACTITIONER

## 2019-06-12 PROCEDURE — 3017F COLORECTAL CA SCREEN DOC REV: CPT | Performed by: NURSE PRACTITIONER

## 2019-06-12 PROCEDURE — 4004F PT TOBACCO SCREEN RCVD TLK: CPT | Performed by: NURSE PRACTITIONER

## 2019-06-12 PROCEDURE — G8417 CALC BMI ABV UP PARAM F/U: HCPCS | Performed by: NURSE PRACTITIONER

## 2019-06-12 PROCEDURE — 99212 OFFICE O/P EST SF 10 MIN: CPT | Performed by: NURSE PRACTITIONER

## 2019-06-12 PROCEDURE — G8427 DOCREV CUR MEDS BY ELIG CLIN: HCPCS | Performed by: NURSE PRACTITIONER

## 2019-06-12 RX ORDER — METOPROLOL SUCCINATE 50 MG/1
25 TABLET, EXTENDED RELEASE ORAL DAILY
Qty: 30 TABLET | Refills: 3 | Status: SHIPPED | OUTPATIENT
Start: 2019-06-12 | End: 2019-07-09 | Stop reason: SDUPTHER

## 2019-06-12 RX ORDER — DOFETILIDE 0.25 MG/1
250 CAPSULE ORAL EVERY 12 HOURS SCHEDULED
Qty: 60 CAPSULE | Refills: 3 | Status: SHIPPED | OUTPATIENT
Start: 2019-06-12 | End: 2022-03-16 | Stop reason: SDUPTHER

## 2019-06-12 NOTE — PROGRESS NOTES
Electrophysiology FU Note      Chief complaint :  SOB    Referring physician:        Primary care physician: Alberto Moon DO      History of Present Illness: This visit (6/12/2019)  Patient is here for follow up on atrial fibrillation s/p Tikosyn monitoring therapy. He reports that he is feeling well, however fatigued. He states that he feels he could sleep all the time. He denies palpitations, shortness of breath, dizziness, syncope or edema. Previous visit (5/23/2019)      Chief Complaint   Patient presents with    Atrial Fibrillation     OV for 3 month check on afib. Denies chest pain, shortness of breath. Denies palpitations  Feels over all okay - some tiredness he reports. Previous visit:(2/6/2019)      Chief Complaint   Patient presents with    Tachycardia     Pt is here for a 3 wk f/u ablation. Pt denies chest pain, shortness of breath, dizziness, palpitations, and edema. Previous visit: (1/9/2018)      Chief Complaint   Patient presents with    Irregular Heart Beat     Gaetano patient here to follow up after cardioversion 12/26. Felt good post cardioversion for few days and then he started back having problems. Pt c/o SOB all the time but not new or worse than before. Pt denies CP, dizziness, palpitations or edema. Patient receiveing Xarelto samples and needs amio refill.  Shortness of Breath           Previous visit (12/12/2018)      Chief Complaint   Patient presents with    Chest Pain      patient sent here from PCP visit this morning. Pt c/o chest pains, radiating into neck, shoulder and hip pain. Sob with activity, palpitations, migraines, dizziness and cold sweats. Episodes have happened at work while standing on his machine. Symptoms are getting worse after experiencing them for the past few months. Pt denies edema.       Shortness of Breath    Palpitations    Dizziness           Previous visit:2/10/2016)      Chief Complaint   Patient presents with    6 Month Follow-Up     Patient is here for 6 month OV, he denies any chest pain, palpitations, dizziness, SOB or edema. Pt wants to discuss some of his medications with you. Over all he feels lot better  He reports arthralgia at times - question of medication induced  Metoprolol makes him tired for some time after he takes it. Previous visit: (7/10/2015)      Chief Complaint   Patient presents with    Check-Up     2 month follow up. Denies dizziness, edema, and palpitations. He hasn't felt like he has been in A-Fib since having the ablation. Had great energies and was able to work with out any problem till he has come down with this URI       Chest Congestion     Patient states that for the last 3 weeks he has been feeling a congestion in his chest. He says it feels like he might be getting pneumonia. When he lays down it feels like there is a weight on his chest. He says he also feels achy in his joints. He was too scared to take anything that might cause him to go back into A-Fib. Sputum - yellowish      Shortness of Breath     Patient states that he feels short of breath with stairs only this started with URI symptoms. Denies chest pain,  palpitations, syncope or presyncope, edema        Previous visit: 2/20/2015      Chief Complaint   Patient presents with    Fatigue     Patient had cardioversion on 01/23/15. He states he did okay until last 2 days he started feeling tired, he went to PCP who did EKG and told him that he was having flutter. Patient states that he has been having some chest pain and sweating when his heart rate is fast. After taking digoxin he felt better      called me from his clinic. I recommended to start on low dose digoxin as patient did not want to go to hospital for rapid ventricular rate. I arranged an appointment today so that I can assess patient. Patient feels okay now but tired.    Has low energy and after work feels very drained. Previous visit:    Chief Complaint   Patient presents with    Fatigue     Patient here for 3 week follow up with EKG. Patient states that he feels tired all of the time. He states that he had labs with PCP on 01/16/15 and again on yesterday. Patient is here to discuss possible cardioversion. Tired all the time    Patient had an LA clot when we performed CARMENZA since then on anticoagulation - 4 weeks    Chest tightness off and on - with palpitations  Shortness of breath Yes with palpitations  Palpitations Yes twice an hour  Syncope or Presyncope No  Edema No  Claudication No        Past Medical History:   Diagnosis Date    Arrhythmia 11/2014    Atrial fib    Arthritis     right shoulder    H/O cardiovascular stress test 12/12/2014    cardiolite-normal, no ischemia    H/O echocardiogram 4/13/15    EF 50-55% Mildly dilated left atrium. Borderline sized right ventricle. Trace MR & TR. Past Surgical History:   Procedure Laterality Date    ACHILLES TENDON SURGERY  2005    repair torn Jcarlos's tendon    BACK SURGERY      CARDIOVERSION  11/17/14    HERNIA REPAIR      OTHER SURGICAL HISTORY  3/10/15    cardiac ablation for a fib/ a flutter    SHOULDER SURGERY  2010    right shoulder rotator cuff surgery       Family History   Problem Relation Age of Onset    Cancer Father     Diabetes Father         reports that he has never smoked. His smokeless tobacco use includes chew. He reports that he drinks alcohol. He reports that he does not use drugs.     Allergies   Allergen Reactions    Codeine Nausea Only    Pcn [Penicillins] Rash    Sulfa Antibiotics Rash       Current Outpatient Medications   Medication Sig Dispense Refill    metoprolol succinate (TOPROL XL) 50 MG extended release tablet Take 0.5 tablets by mouth daily 30 tablet 3    dofetilide (TIKOSYN) 250 MCG capsule Take 1 capsule by mouth every 12 hours 60 capsule 3    rivaroxaban (XARELTO) 20 MG TABS tablet Take 1 tablet by mouth daily (with breakfast) 35 tablet 0    Multiple Vitamins-Minerals (MULTIVITAMIN ADULT PO) Take by mouth       No current facility-administered medications for this visit. Facility-Administered Medications Ordered in Other Visits   Medication Dose Route Frequency Provider Last Rate Last Dose    midazolam (VERSED) injection 2 mg  2 mg Intravenous Once Nereida Lindsey MD           Review of Systems:   Review of Systems   Constitutional:  Tired and weak. Negative for fever, chills and activity change. HENT: Negative for congestion, ear pain and tinnitus. Eyes: Negative for photophobia, pain and visual disturbance. Respiratory:  Negative for shortness of breath Negative for cough, chest tightness and wheezing. Cardiovascular:  Negative for chest pain and palpitations Negative for leg swelling. Gastrointestinal: Negative for nausea, vomiting, abdominal pain, diarrhea, constipation and blood in stool. Endocrine: Negative for cold intolerance and heat intolerance. Genitourinary: Negative for dysuria, hematuria and flank pain. Musculoskeletal: Negative for myalgias, and neck stiffness. Arthralgias  Skin: Negative for color change and rash. Allergic/Immunologic: Negative for food allergies. Neurological:  Negative for dizziness, light-headedness, numbness and headaches. Hematological: Does not bruise/bleed easily. Psychiatric/Behavioral: Negative for behavioral problems, confusion and agitation. Physical Examination:    /70   Pulse 62   Ht 5' 10\" (1.778 m)   Wt 215 lb 12.8 oz (97.9 kg)   BMI 30.96 kg/m²    Wt Readings from Last 3 Encounters:   06/12/19 215 lb 12.8 oz (97.9 kg)   05/26/19 211 lb 8 oz (95.9 kg)   05/22/19 215 lb (97.5 kg)     Body mass index is 30.96 kg/m². Physical Exam   Constitutional: He is oriented to person, place, and time and well-developed, well-nourished, and in no distress. HENT:   Head: Normocephalic and atraumatic.    Eyes: Conjunctivae  are normal. Pupils are equal, round, and reactive to light. Right and left eye exhibits no discharge. Neck: Normal range of motion. No JVD present. No thyromegaly present. Cardiovascular: Regular rhythm and  tachycardic. Exam reveals no friction rub. No murmur heard. Pulmonary/Chest: Effort normal and breath sounds normal. No stridor. No respiratory distress. He has no wheezes. Abdominal: Soft. Bowel sounds are normal. He exhibits no distension. There is no tenderness. Musculoskeletal: Normal range of motion. He exhibits no edema or tenderness. Neurological: He is alert and oriented to person, place, and time. He displays normal reflexes. No cranial nerve deficit. Coordination normal.   Skin: Skin is warm and dry. No rash noted. No erythema.    Psychiatric: Mood and affect normal.           CBC:   Lab Results   Component Value Date    WBC 7.2 05/26/2019    HGB 14.7 05/26/2019    HCT 44.7 05/26/2019     05/26/2019     Lipids:   Lab Results   Component Value Date    CHOL 151 07/12/2018     PT/INR:   No components found for: PT,  INR     BMP:    Lab Results   Component Value Date     05/26/2019    K 4.6 05/26/2019     05/26/2019    CO2 24 05/26/2019    BUN 16 05/26/2019     CMP:   Lab Results   Component Value Date    AST 22 07/11/2018    PROT 6.4 07/11/2018    BILITOT 0.6 07/11/2018    ALKPHOS 81 07/11/2018     TSH:    No results found for: TSH    EKGINTERPRETATION - EKG Interpretation:        IMPRESSION / RECOMMENDATIONS:     S/P Tikosyn Monitoring Therapy  S/P cardioversion  Atrial tachycardia or flutter  Atrial fibrillation sp ablation  Atrial flutter sp ablation  History of tachycardia induced cardiomyopathy which resolved post ablation    EKG obtained  In SR  QTc 413  Continue Tikosyn 250mcg BID  Continue xarelto  Will decrease toprol xl to 25 mg daily  Reassess fatigue in 1 month  1 month with Dr Russell Trujillo, MATTIE - CNP

## 2019-06-14 ENCOUNTER — TELEPHONE (OUTPATIENT)
Dept: CARDIOLOGY CLINIC | Age: 49
End: 2019-06-14

## 2019-06-14 NOTE — TELEPHONE ENCOUNTER
Left message for patient we scheduled 1 month follow up with Brook Gaspar for 7/17 @ 4:30. If that day & time does not work for him to please call us back to reschedule.  Otherwise, we will see him on 7/17 @ 4:30

## 2019-06-18 ENCOUNTER — TELEPHONE (OUTPATIENT)
Dept: CARDIOLOGY CLINIC | Age: 49
End: 2019-06-18

## 2019-06-18 NOTE — TELEPHONE ENCOUNTER
Left message for patient advised him that Dalia Case will out of office on 7/17. We moved appt to 7/24 @ 4:30 if that time does not work please call the office.

## 2019-06-21 RX ORDER — RIVAROXABAN 20 MG/1
TABLET, FILM COATED ORAL
Qty: 30 TABLET | Refills: 3 | Status: SHIPPED | OUTPATIENT
Start: 2019-06-21 | End: 2019-12-09 | Stop reason: SDUPTHER

## 2019-07-09 RX ORDER — METOPROLOL SUCCINATE 50 MG/1
25 TABLET, EXTENDED RELEASE ORAL DAILY
Qty: 30 TABLET | Refills: 3 | Status: SHIPPED | OUTPATIENT
Start: 2019-07-09 | End: 2022-03-16 | Stop reason: SDUPTHER

## 2019-07-22 ENCOUNTER — TELEPHONE (OUTPATIENT)
Dept: CARDIOLOGY CLINIC | Age: 49
End: 2019-07-22

## 2019-08-21 ENCOUNTER — OFFICE VISIT (OUTPATIENT)
Dept: CARDIOLOGY CLINIC | Age: 49
End: 2019-08-21
Payer: COMMERCIAL

## 2019-08-21 VITALS
HEART RATE: 84 BPM | WEIGHT: 219 LBS | DIASTOLIC BLOOD PRESSURE: 80 MMHG | BODY MASS INDEX: 31.35 KG/M2 | HEIGHT: 70 IN | SYSTOLIC BLOOD PRESSURE: 116 MMHG

## 2019-08-21 DIAGNOSIS — I48.91 ATRIAL FIBRILLATION, UNSPECIFIED TYPE (HCC): Primary | ICD-10-CM

## 2019-08-21 DIAGNOSIS — Z98.890 HISTORY OF CARDIOVERSION: ICD-10-CM

## 2019-08-21 DIAGNOSIS — Z79.899 VISIT FOR MONITORING TIKOSYN THERAPY: ICD-10-CM

## 2019-08-21 DIAGNOSIS — Z51.81 VISIT FOR MONITORING TIKOSYN THERAPY: ICD-10-CM

## 2019-08-21 PROCEDURE — 93000 ELECTROCARDIOGRAM COMPLETE: CPT | Performed by: NURSE PRACTITIONER

## 2019-08-21 PROCEDURE — G8428 CUR MEDS NOT DOCUMENT: HCPCS | Performed by: NURSE PRACTITIONER

## 2019-08-21 PROCEDURE — G8417 CALC BMI ABV UP PARAM F/U: HCPCS | Performed by: NURSE PRACTITIONER

## 2019-08-21 PROCEDURE — 3017F COLORECTAL CA SCREEN DOC REV: CPT | Performed by: NURSE PRACTITIONER

## 2019-08-21 PROCEDURE — 99213 OFFICE O/P EST LOW 20 MIN: CPT | Performed by: NURSE PRACTITIONER

## 2019-08-21 PROCEDURE — 4004F PT TOBACCO SCREEN RCVD TLK: CPT | Performed by: NURSE PRACTITIONER

## 2019-08-21 NOTE — PROGRESS NOTES
 Shortness of Breath    Palpitations    Dizziness           Previous visit:2/10/2016)      Chief Complaint   Patient presents with    6 Month Follow-Up     Patient is here for 6 month OV, he denies any chest pain, palpitations, dizziness, SOB or edema. Pt wants to discuss some of his medications with you. Over all he feels lot better  He reports arthralgia at times - question of medication induced  Metoprolol makes him tired for some time after he takes it. Previous visit: (7/10/2015)      Chief Complaint   Patient presents with    Check-Up     2 month follow up. Denies dizziness, edema, and palpitations. He hasn't felt like he has been in A-Fib since having the ablation. Had great energies and was able to work with out any problem till he has come down with this URI       Chest Congestion     Patient states that for the last 3 weeks he has been feeling a congestion in his chest. He says it feels like he might be getting pneumonia. When he lays down it feels like there is a weight on his chest. He says he also feels achy in his joints. He was too scared to take anything that might cause him to go back into A-Fib. Sputum - yellowish      Shortness of Breath     Patient states that he feels short of breath with stairs only this started with URI symptoms. Denies chest pain,  palpitations, syncope or presyncope, edema        Previous visit: 2/20/2015      Chief Complaint   Patient presents with    Fatigue     Patient had cardioversion on 01/23/15. He states he did okay until last 2 days he started feeling tired, he went to PCP who did EKG and told him that he was having flutter. Patient states that he has been having some chest pain and sweating when his heart rate is fast. After taking digoxin he felt better      called me from his clinic. I recommended to start on low dose digoxin as patient did not want to go to hospital for rapid ventricular rate.   I arranged an appointment today so that I can assess patient. Patient feels okay now but tired. Has low energy and after work feels very drained. Previous visit:    Chief Complaint   Patient presents with    Fatigue     Patient here for 3 week follow up with EKG. Patient states that he feels tired all of the time. He states that he had labs with PCP on 01/16/15 and again on yesterday. Patient is here to discuss possible cardioversion. Tired all the time    Patient had an LA clot when we performed CARMENZA since then on anticoagulation - 4 weeks    Chest tightness off and on - with palpitations  Shortness of breath Yes with palpitations  Palpitations Yes twice an hour  Syncope or Presyncope No  Edema No  Claudication No        Past Medical History:   Diagnosis Date    Arrhythmia 11/2014    Atrial fib    Arthritis     right shoulder    H/O cardiovascular stress test 12/12/2014    cardiolite-normal, no ischemia    H/O echocardiogram 4/13/15    EF 50-55% Mildly dilated left atrium. Borderline sized right ventricle. Trace MR & TR. Past Surgical History:   Procedure Laterality Date    ACHILLES TENDON SURGERY  2005    repair torn [place]'s tendon    BACK SURGERY      CARDIOVERSION  11/17/14    HERNIA REPAIR      OTHER SURGICAL HISTORY  3/10/15    cardiac ablation for a fib/ a flutter    SHOULDER SURGERY  2010    right shoulder rotator cuff surgery       Family History   Problem Relation Age of Onset    Cancer Father     Diabetes Father         reports that he has never smoked. His smokeless tobacco use includes chew. He reports that he drinks alcohol. He reports that he does not use drugs.     Allergies   Allergen Reactions    Codeine Nausea Only    Pcn [Penicillins] Rash    Sulfa Antibiotics Rash       Current Outpatient Medications   Medication Sig Dispense Refill    lipase-protease-amylase (CREON) 45823-27312 units delayed release capsule Take 2 capsules by mouth 3 times daily (with meals)      metoprolol succinate

## 2019-09-06 PROBLEM — Z98.890 HISTORY OF CARDIOVERSION: Status: ACTIVE | Noted: 2019-09-06

## 2019-09-06 PROBLEM — Z92.89 HISTORY OF CARDIOVERSION: Status: ACTIVE | Noted: 2019-09-06

## 2020-01-07 ENCOUNTER — TELEPHONE (OUTPATIENT)
Dept: CARDIOLOGY CLINIC | Age: 50
End: 2020-01-07

## 2020-02-19 ENCOUNTER — OFFICE VISIT (OUTPATIENT)
Dept: CARDIOLOGY CLINIC | Age: 50
End: 2020-02-19
Payer: COMMERCIAL

## 2020-02-19 VITALS
BODY MASS INDEX: 31.3 KG/M2 | HEART RATE: 77 BPM | WEIGHT: 218.6 LBS | HEIGHT: 70 IN | SYSTOLIC BLOOD PRESSURE: 138 MMHG | DIASTOLIC BLOOD PRESSURE: 88 MMHG

## 2020-02-19 PROCEDURE — 93000 ELECTROCARDIOGRAM COMPLETE: CPT | Performed by: INTERNAL MEDICINE

## 2020-02-19 PROCEDURE — 99212 OFFICE O/P EST SF 10 MIN: CPT | Performed by: INTERNAL MEDICINE

## 2020-02-19 NOTE — PROGRESS NOTES
Electrophysiology FU Note      Chief complaint :  Follow up for atrial fibrillation    Referring physician:        Primary care physician: Lanie Denver, DO      History of Present Illness: This visit (2/19/2020)      Chief Complaint   Patient presents with    6 Month Follow-Up     pt here for Follow Up. pt denies Chest Pain, Palpitaions,SOB, Dizziness, Edma. Pt drinks alcohol and caffeine occasionally. Previous visit:(5/23/2019)      Chief Complaint   Patient presents with    Atrial Fibrillation     OV for 3 month check on afib. Denies chest pain, shortness of breath. Denies palpitations  Feels over all okay - some tiredness he reports. Previous visit:(2/6/2019)      Chief Complaint   Patient presents with    Tachycardia     Pt is here for a 3 wk f/u ablation. Pt denies chest pain, shortness of breath, dizziness, palpitations, and edema. Previous visit: (1/9/2018)      Chief Complaint   Patient presents with    Irregular Heart Beat     Gaetano patient here to follow up after cardioversion 12/26. Felt good post cardioversion for few days and then he started back having problems. Pt c/o SOB all the time but not new or worse than before. Pt denies CP, dizziness, palpitations or edema. Patient receiveing Xarelto samples and needs amio refill.  Shortness of Breath           Previous visit (12/12/2018)      Chief Complaint   Patient presents with    Chest Pain      patient sent here from PCP visit this morning. Pt c/o chest pains, radiating into neck, shoulder and hip pain. Sob with activity, palpitations, migraines, dizziness and cold sweats. Episodes have happened at work while standing on his machine. Symptoms are getting worse after experiencing them for the past few months. Pt denies edema.       Shortness of Breath    Palpitations    Dizziness           Previous visit:2/10/2016)      Chief Complaint   Patient presents with    6 Month Follow-Up Patient presents with    Fatigue     Patient here for 3 week follow up with EKG. Patient states that he feels tired all of the time. He states that he had labs with PCP on 01/16/15 and again on yesterday. Patient is here to discuss possible cardioversion. Tired all the time    Patient had an LA clot when we performed CARMENZA since then on anticoagulation - 4 weeks    Chest tightness off and on - with palpitations  Shortness of breath Yes with palpitations  Palpitations Yes twice an hour  Syncope or Presyncope No  Edema No  Claudication No        Past Medical History:   Diagnosis Date    Arrhythmia 11/2014    Atrial fib    Arthritis     right shoulder    H/O cardiovascular stress test 12/12/2014    cardiolite-normal, no ischemia    H/O echocardiogram 4/13/15    EF 50-55% Mildly dilated left atrium. Borderline sized right ventricle. Trace MR & TR. Past Surgical History:   Procedure Laterality Date    ACHILLES TENDON SURGERY  2005    repair torn [place]'s tendon    BACK SURGERY      CARDIOVERSION  11/17/14    HERNIA REPAIR      OTHER SURGICAL HISTORY  3/10/15    cardiac ablation for a fib/ a flutter    SHOULDER SURGERY  2010    right shoulder rotator cuff surgery       Family History   Problem Relation Age of Onset    Cancer Father     Diabetes Father         reports that he has never smoked. His smokeless tobacco use includes chew. He reports current alcohol use. He reports that he does not use drugs.     Allergies   Allergen Reactions    Codeine Nausea Only    Pcn [Penicillins] Rash    Sulfa Antibiotics Rash       Current Outpatient Medications   Medication Sig Dispense Refill    rivaroxaban (XARELTO) 20 MG TABS tablet TAKE 1 TABLET BY MOUTH ONCE DAILY 21 tablet 0    metoprolol succinate (TOPROL XL) 50 MG extended release tablet Take 0.5 tablets by mouth daily 30 tablet 3    dofetilide (TIKOSYN) 250 MCG capsule Take 1 capsule by mouth every 12 hours 60 capsule 3    Multiple exhibits no discharge. Neck: Normal range of motion. No JVD present. No thyromegaly present. Cardiovascular: Regular rhythm and  tachycardic. Exam reveals no friction rub. No murmur heard. Pulmonary/Chest: Effort normal and breath sounds normal. No stridor. No respiratory distress. He has no wheezes. Abdominal: Soft. Bowel sounds are normal. He exhibits no distension. There is no tenderness. Musculoskeletal: Normal range of motion. He exhibits no edema or tenderness. Neurological: He is alert and oriented to person, place, and time. He displays normal reflexes. No cranial nerve deficit. Coordination normal.   Skin: Skin is warm and dry. No rash noted. No erythema.    Psychiatric: Mood and affect normal.           CBC:   Lab Results   Component Value Date    WBC 7.2 05/26/2019    HGB 14.7 05/26/2019    HCT 44.7 05/26/2019     05/26/2019     Lipids:   Lab Results   Component Value Date    CHOL 151 07/12/2018     PT/INR:   No components found for: PT,  INR     BMP:    Lab Results   Component Value Date     05/26/2019    K 4.6 05/26/2019     05/26/2019    CO2 24 05/26/2019    BUN 16 05/26/2019     CMP:   Lab Results   Component Value Date    AST 22 07/11/2018    PROT 6.4 07/11/2018    BILITOT 0.6 07/11/2018    ALKPHOS 81 07/11/2018     TSH:    No results found for: TSH    EKGINTERPRETATION - EKG Interpretation:  Atrial tachycardiar with RVR      IMPRESSION / RECOMMENDATIONS:     Paroxysmal atrial tachycardia  Atrial fibrillation sp ablation  Atrial flutter sp ablation  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

## 2020-03-04 ENCOUNTER — PROCEDURE VISIT (OUTPATIENT)
Dept: CARDIOLOGY CLINIC | Age: 50
End: 2020-03-04
Payer: COMMERCIAL

## 2020-03-04 LAB
LV EF: 58 %
LVEF MODALITY: NORMAL

## 2020-03-04 PROCEDURE — 93306 TTE W/DOPPLER COMPLETE: CPT | Performed by: INTERNAL MEDICINE

## 2020-03-05 ENCOUNTER — TELEPHONE (OUTPATIENT)
Dept: CARDIOLOGY CLINIC | Age: 50
End: 2020-03-05

## 2020-04-06 ENCOUNTER — TELEPHONE (OUTPATIENT)
Dept: CARDIOLOGY CLINIC | Age: 50
End: 2020-04-06

## 2020-08-19 ENCOUNTER — OFFICE VISIT (OUTPATIENT)
Dept: CARDIOLOGY CLINIC | Age: 50
End: 2020-08-19
Payer: COMMERCIAL

## 2020-08-19 VITALS
BODY MASS INDEX: 32.47 KG/M2 | DIASTOLIC BLOOD PRESSURE: 80 MMHG | WEIGHT: 226.8 LBS | SYSTOLIC BLOOD PRESSURE: 118 MMHG | HEIGHT: 70 IN | HEART RATE: 71 BPM

## 2020-08-19 PROCEDURE — 99213 OFFICE O/P EST LOW 20 MIN: CPT | Performed by: NURSE PRACTITIONER

## 2020-08-19 PROCEDURE — 93000 ELECTROCARDIOGRAM COMPLETE: CPT | Performed by: NURSE PRACTITIONER

## 2020-08-19 NOTE — PROGRESS NOTES
Electrophysiology FU Note      Chief complaint :  Follow up for atrial fibrillation    Referring physician:        Primary care physician: Velevt Le DO      History of Present Illness: 8/19/2020    Misa Harrell states that he is feeling well. He is denying any shortness of breath or palpitations. He does have occasional alcoholic beverage. He minimizes caffeine intake. Previous visit (2/19/2020)      Chief Complaint   Patient presents with    6 Month Follow-Up     pt here for Follow Up. pt denies Chest Pain, Palpitaions,SOB, Dizziness, Edma. Pt drinks alcohol and caffeine occasionally. Previous visit:(5/23/2019)      Chief Complaint   Patient presents with    Atrial Fibrillation     OV for 3 month check on afib. Denies chest pain, shortness of breath. Denies palpitations  Feels over all okay - some tiredness he reports. Previous visit:(2/6/2019)      Chief Complaint   Patient presents with    Tachycardia     Pt is here for a 3 wk f/u ablation. Pt denies chest pain, shortness of breath, dizziness, palpitations, and edema. Previous visit: (1/9/2018)      Chief Complaint   Patient presents with    Irregular Heart Beat     Gaetano patient here to follow up after cardioversion 12/26. Felt good post cardioversion for few days and then he started back having problems. Pt c/o SOB all the time but not new or worse than before. Pt denies CP, dizziness, palpitations or edema. Patient receiveing Xarelto samples and needs amio refill.  Shortness of Breath           Previous visit (12/12/2018)      Chief Complaint   Patient presents with    Chest Pain      patient sent here from PCP visit this morning. Pt c/o chest pains, radiating into neck, shoulder and hip pain. Sob with activity, palpitations, migraines, dizziness and cold sweats. Episodes have happened at work while standing on his machine.   Symptoms are getting worse after experiencing them for the past few arranged an appointment today so that I can assess patient. Patient feels okay now but tired. Has low energy and after work feels very drained. Previous visit:    Chief Complaint   Patient presents with    Fatigue     Patient here for 3 week follow up with EKG. Patient states that he feels tired all of the time. He states that he had labs with PCP on 01/16/15 and again on yesterday. Patient is here to discuss possible cardioversion. Tired all the time    Patient had an LA clot when we performed CARMENZA since then on anticoagulation - 4 weeks    Chest tightness off and on - with palpitations  Shortness of breath Yes with palpitations  Palpitations Yes twice an hour  Syncope or Presyncope No  Edema No  Claudication No        Past Medical History:   Diagnosis Date    Arrhythmia 11/2014    Atrial fib    Arthritis     right shoulder    H/O cardiovascular stress test 12/12/2014    cardiolite-normal, no ischemia    H/O echocardiogram 4/13/15    EF 50-55% Mildly dilated left atrium. Borderline sized right ventricle. Trace MR & TR.    History of echocardiogram 03/04/2020    EF 55-60%, Left atrium mildly dilated, no pericardial effusion, no significant valvular disease or diastolic dysfunction       Past Surgical History:   Procedure Laterality Date    ACHILLES TENDON SURGERY  2005    repair torn [place]'s tendon    BACK SURGERY      CARDIOVERSION  11/17/14    HERNIA REPAIR      OTHER SURGICAL HISTORY  3/10/15    cardiac ablation for a fib/ a flutter    SHOULDER SURGERY  2010    right shoulder rotator cuff surgery       Family History   Problem Relation Age of Onset    Cancer Father     Diabetes Father         reports that he has never smoked. His smokeless tobacco use includes chew. He reports current alcohol use. He reports that he does not use drugs.     Allergies   Allergen Reactions    Codeine Nausea Only    Pcn [Penicillins] Rash    Sulfa Antibiotics Rash       Current Outpatient Medications Medication Sig Dispense Refill    ASPIRIN 81 PO Take 81 mg by mouth daily      metoprolol succinate (TOPROL XL) 50 MG extended release tablet Take 0.5 tablets by mouth daily 30 tablet 3    dofetilide (TIKOSYN) 250 MCG capsule Take 1 capsule by mouth every 12 hours 60 capsule 3    Multiple Vitamins-Minerals (MULTIVITAMIN ADULT PO) Take by mouth       No current facility-administered medications for this visit. Facility-Administered Medications Ordered in Other Visits   Medication Dose Route Frequency Provider Last Rate Last Dose    midazolam (VERSED) injection 2 mg  2 mg Intravenous Once Panda Trujillo MD           Review of Systems:   Review of Systems   Constitutional:  Tired and weak. Negative for fever, chills and activity change. HENT: Negative for congestion, ear pain and tinnitus. Eyes: Negative for photophobia, pain and visual disturbance. Respiratory:  shortness of breath Negative for cough, chest tightness and wheezing. Cardiovascular:  chest pain and palpitations Negative for leg swelling. Gastrointestinal: Negative for nausea, vomiting, abdominal pain, diarrhea, constipation and blood in stool. Endocrine: Negative for cold intolerance and heat intolerance. Genitourinary: Negative for dysuria, hematuria and flank pain. Musculoskeletal: Negative for myalgias, and neck stiffness. Arthralgias  Skin: Negative for color change and rash. Allergic/Immunologic: Negative for food allergies. Neurological:  Negative for dizziness, light-headedness, numbness and headaches. Hematological: Does not bruise/bleed easily. Psychiatric/Behavioral: Negative for behavioral problems, confusion and agitation.            Physical Examination:    /80   Pulse 71   Ht 5' 10\" (1.778 m)   Wt 226 lb 12.8 oz (102.9 kg)   BMI 32.54 kg/m²    Wt Readings from Last 3 Encounters:   08/19/20 226 lb 12.8 oz (102.9 kg)   02/19/20 218 lb 9.6 oz (99.2 kg)   08/21/19 219 lb (99.3 kg)     Body mass index is 32.54 kg/m². Physical Exam   Constitutional: He is oriented to person, place, and time and well-developed, and in no distress. HENT:   Head: Normocephalic and atraumatic. Eyes: Conjunctivae and EOM are normal. Pupils are equal, round, and reactive to light. Right eye exhibits no discharge. Neck: Normal range of motion. No JVD present. Cardiovascular: Regular rhythm and rate- no friction rub. No murmur heard. No murmur appreciated   Pulmonary/Chest: Effort normal and breath sounds normal. No stridor. No respiratory distress. He has no wheezes. Abdominal: Soft. Bowel sounds are normal. He exhibits no distension. There is no tenderness. Musculoskeletal: Normal range of motion. He exhibits no edema or tenderness. Neurological: He is alert and oriented to person, place, and time. He displays normal reflexes. No cranial nerve deficit. Coordination normal.   Skin: Skin is warm and dry. No rash noted. No erythema.    Psychiatric: Mood and affect normal.           CBC:   Lab Results   Component Value Date    WBC 7.2 05/26/2019    HGB 14.7 05/26/2019    HCT 44.7 05/26/2019     05/26/2019     Lipids:   Lab Results   Component Value Date    CHOL 151 07/12/2018     PT/INR:   No components found for: PT,  INR     BMP:    Lab Results   Component Value Date     05/26/2019    K 4.6 05/26/2019     05/26/2019    CO2 24 05/26/2019    BUN 16 05/26/2019     CMP:   Lab Results   Component Value Date    AST 22 07/11/2018    PROT 6.4 07/11/2018    BILITOT 0.6 07/11/2018    ALKPHOS 81 07/11/2018     TSH:    No results found for: TSH    EKGINTERPRETATION - EKG Interpretation: SR     IMPRESSION / RECOMMENDATIONS:     Paroxysmal atrial tachycardia  H/o of Atrial fibrillation ablation  H/o of Atrial flutter ablation  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

## 2020-08-19 NOTE — LETTER
[address] Miguel Skeen D Claudell Junes 1970  [id number]    Have you had any Chest Pain - No    Have you had any Shortness of Breath - No      Have you had any dizziness - No      Have you had any palpitations - No      Do you have any edema - No    Do you have a surgery or procedure scheduled in the near future - No

## 2020-11-03 PROBLEM — I48.91 ATRIAL FIBRILLATION (HCC): Status: RESOLVED | Noted: 2020-11-03 | Resolved: 2020-11-03

## 2020-11-27 ENCOUNTER — HOSPITAL ENCOUNTER (EMERGENCY)
Age: 50
Discharge: HOME | End: 2020-11-27
Attending: EMERGENCY MEDICINE
Payer: COMMERCIAL

## 2020-11-27 ENCOUNTER — APPOINTMENT (OUTPATIENT)
Dept: CT IMAGING | Age: 50
End: 2020-11-27
Payer: COMMERCIAL

## 2020-11-27 ENCOUNTER — HOSPITAL ENCOUNTER (INPATIENT)
Age: 50
LOS: 1 days | Discharge: HOME | DRG: 343 | End: 2020-11-28
Attending: SURGERY | Admitting: SURGERY
Payer: COMMERCIAL

## 2020-11-27 VITALS
RESPIRATION RATE: 16 BRPM | BODY MASS INDEX: 31.5 KG/M2 | HEIGHT: 70 IN | WEIGHT: 220 LBS | SYSTOLIC BLOOD PRESSURE: 154 MMHG | HEART RATE: 84 BPM | OXYGEN SATURATION: 98 % | TEMPERATURE: 99.1 F | DIASTOLIC BLOOD PRESSURE: 88 MMHG

## 2020-11-27 PROBLEM — K37 APPENDICITIS: Status: ACTIVE | Noted: 2020-11-27

## 2020-11-27 LAB
ALBUMIN SERPL-MCNC: 4.4 GM/DL (ref 3.4–5)
ALP BLD-CCNC: 72 IU/L (ref 40–129)
ALT SERPL-CCNC: 37 U/L (ref 10–40)
ANION GAP SERPL CALCULATED.3IONS-SCNC: 13 MMOL/L (ref 4–16)
AST SERPL-CCNC: 19 IU/L (ref 15–37)
BACTERIA: ABNORMAL /HPF
BASOPHILS ABSOLUTE: 0 K/CU MM
BASOPHILS RELATIVE PERCENT: 0.4 % (ref 0–1)
BILIRUB SERPL-MCNC: 0.5 MG/DL (ref 0–1)
BILIRUBIN URINE: ABNORMAL MG/DL
BLOOD, URINE: NEGATIVE
BUN BLDV-MCNC: 15 MG/DL (ref 6–23)
CALCIUM SERPL-MCNC: 9.5 MG/DL (ref 8.3–10.6)
CAST TYPE: ABNORMAL /HPF
CHLORIDE BLD-SCNC: 102 MMOL/L (ref 99–110)
CLARITY: CLEAR
CO2: 24 MMOL/L (ref 21–32)
COLOR: YELLOW
CREAT SERPL-MCNC: 1 MG/DL (ref 0.9–1.3)
CRYSTAL TYPE: ABNORMAL /HPF
DIFFERENTIAL TYPE: ABNORMAL
EOSINOPHILS ABSOLUTE: 0.1 K/CU MM
EOSINOPHILS RELATIVE PERCENT: 1.2 % (ref 0–3)
EPITHELIAL CELLS, UA: ABNORMAL /HPF
GFR AFRICAN AMERICAN: >60 ML/MIN/1.73M2
GFR NON-AFRICAN AMERICAN: >60 ML/MIN/1.73M2
GLUCOSE BLD-MCNC: 116 MG/DL (ref 70–99)
GLUCOSE, URINE: NEGATIVE MG/DL
HCT VFR BLD CALC: 42.8 % (ref 42–52)
HEMOGLOBIN: 14.6 GM/DL (ref 13.5–18)
ICTOTEST: NEGATIVE
IMMATURE NEUTROPHIL %: 0.5 % (ref 0–0.43)
KETONES, URINE: ABNORMAL MG/DL
LACTATE: 1.7 MMOL/L (ref 0.4–2)
LEUKOCYTE ESTERASE, URINE: NEGATIVE
LIPASE: 15 IU/L (ref 13–60)
LYMPHOCYTES ABSOLUTE: 1.8 K/CU MM
LYMPHOCYTES RELATIVE PERCENT: 16.4 % (ref 24–44)
MCH RBC QN AUTO: 30.2 PG (ref 27–31)
MCHC RBC AUTO-ENTMCNC: 34.1 % (ref 32–36)
MCV RBC AUTO: 88.4 FL (ref 78–100)
MONOCYTES ABSOLUTE: 0.8 K/CU MM
MONOCYTES RELATIVE PERCENT: 7.5 % (ref 0–4)
MUCUS: ABNORMAL HPF
NITRITE URINE, QUANTITATIVE: NEGATIVE
PDW BLD-RTO: 11.9 % (ref 11.7–14.9)
PH, URINE: 5.5 (ref 5–8)
PLATELET # BLD: 276 K/CU MM (ref 140–440)
PMV BLD AUTO: 9.4 FL (ref 7.5–11.1)
POTASSIUM SERPL-SCNC: 3.7 MMOL/L (ref 3.5–5.1)
PROTEIN UA: NEGATIVE MG/DL
RBC # BLD: 4.84 M/CU MM (ref 4.6–6.2)
RBC URINE: ABNORMAL /HPF (ref 0–3)
SARS-COV-2, NAAT: NOT DETECTED
SEGMENTED NEUTROPHILS ABSOLUTE COUNT: 8.2 K/CU MM
SEGMENTED NEUTROPHILS RELATIVE PERCENT: 74 % (ref 36–66)
SODIUM BLD-SCNC: 139 MMOL/L (ref 135–145)
SOURCE: NORMAL
SPECIFIC GRAVITY UA: 1.03 (ref 1–1.03)
TOTAL IMMATURE NEUTOROPHIL: 0.06 K/CU MM
TOTAL PROTEIN: 7 GM/DL (ref 6.4–8.2)
UROBILINOGEN, URINE: 1 MG/DL (ref 0.2–1)
VOLUME, (UVOL): 12 ML (ref 10–12)
WBC # BLD: 11.1 K/CU MM (ref 4–10.5)
WBC UA: ABNORMAL /HPF (ref 0–2)

## 2020-11-27 PROCEDURE — 96375 TX/PRO/DX INJ NEW DRUG ADDON: CPT

## 2020-11-27 PROCEDURE — 83690 ASSAY OF LIPASE: CPT

## 2020-11-27 PROCEDURE — 81001 URINALYSIS AUTO W/SCOPE: CPT

## 2020-11-27 PROCEDURE — 96368 THER/DIAG CONCURRENT INF: CPT

## 2020-11-27 PROCEDURE — 6360000004 HC RX CONTRAST MEDICATION: Performed by: EMERGENCY MEDICINE

## 2020-11-27 PROCEDURE — 80053 COMPREHEN METABOLIC PANEL: CPT

## 2020-11-27 PROCEDURE — 85025 COMPLETE CBC W/AUTO DIFF WBC: CPT

## 2020-11-27 PROCEDURE — 6360000002 HC RX W HCPCS: Performed by: EMERGENCY MEDICINE

## 2020-11-27 PROCEDURE — 96365 THER/PROPH/DIAG IV INF INIT: CPT

## 2020-11-27 PROCEDURE — U0002 COVID-19 LAB TEST NON-CDC: HCPCS

## 2020-11-27 PROCEDURE — 2580000003 HC RX 258: Performed by: EMERGENCY MEDICINE

## 2020-11-27 PROCEDURE — 99283 EMERGENCY DEPT VISIT LOW MDM: CPT

## 2020-11-27 PROCEDURE — 1200000000 HC SEMI PRIVATE

## 2020-11-27 PROCEDURE — 2500000003 HC RX 250 WO HCPCS: Performed by: EMERGENCY MEDICINE

## 2020-11-27 PROCEDURE — 74177 CT ABD & PELVIS W/CONTRAST: CPT

## 2020-11-27 PROCEDURE — 83605 ASSAY OF LACTIC ACID: CPT

## 2020-11-27 RX ORDER — 0.9 % SODIUM CHLORIDE 0.9 %
1000 INTRAVENOUS SOLUTION INTRAVENOUS ONCE
Status: COMPLETED | OUTPATIENT
Start: 2020-11-27 | End: 2020-11-27

## 2020-11-27 RX ORDER — LEVOFLOXACIN 5 MG/ML
500 INJECTION, SOLUTION INTRAVENOUS ONCE
Status: COMPLETED | OUTPATIENT
Start: 2020-11-27 | End: 2020-11-27

## 2020-11-27 RX ORDER — MORPHINE SULFATE 4 MG/ML
4 INJECTION, SOLUTION INTRAMUSCULAR; INTRAVENOUS EVERY 30 MIN PRN
Status: DISCONTINUED | OUTPATIENT
Start: 2020-11-27 | End: 2020-11-27 | Stop reason: HOSPADM

## 2020-11-27 RX ORDER — ONDANSETRON 2 MG/ML
4 INJECTION INTRAMUSCULAR; INTRAVENOUS EVERY 30 MIN PRN
Status: DISCONTINUED | OUTPATIENT
Start: 2020-11-27 | End: 2020-11-27 | Stop reason: HOSPADM

## 2020-11-27 RX ORDER — SODIUM CHLORIDE 9 MG/ML
INJECTION, SOLUTION INTRAVENOUS CONTINUOUS
Status: DISCONTINUED | OUTPATIENT
Start: 2020-11-27 | End: 2020-11-27 | Stop reason: HOSPADM

## 2020-11-27 RX ADMIN — LEVOFLOXACIN 500 MG: 5 INJECTION, SOLUTION INTRAVENOUS at 20:51

## 2020-11-27 RX ADMIN — SODIUM CHLORIDE 1000 ML: 9 INJECTION, SOLUTION INTRAVENOUS at 19:23

## 2020-11-27 RX ADMIN — ONDANSETRON 4 MG: 2 INJECTION INTRAMUSCULAR; INTRAVENOUS at 19:23

## 2020-11-27 RX ADMIN — IOPAMIDOL 100 ML: 755 INJECTION, SOLUTION INTRAVENOUS at 20:27

## 2020-11-27 RX ADMIN — METRONIDAZOLE 500 MG: 500 INJECTION, SOLUTION INTRAVENOUS at 20:51

## 2020-11-27 RX ADMIN — MORPHINE SULFATE 4 MG: 4 INJECTION, SOLUTION INTRAMUSCULAR; INTRAVENOUS at 19:29

## 2020-11-27 ASSESSMENT — PAIN DESCRIPTION - DESCRIPTORS
DESCRIPTORS: BURNING;PRESSURE
DESCRIPTORS: BURNING;PRESSURE;CONSTANT

## 2020-11-27 ASSESSMENT — PAIN DESCRIPTION - PAIN TYPE
TYPE: ACUTE PAIN
TYPE: ACUTE PAIN

## 2020-11-27 ASSESSMENT — PAIN DESCRIPTION - ORIENTATION
ORIENTATION: LOWER
ORIENTATION: LOWER;RIGHT;LEFT

## 2020-11-27 ASSESSMENT — PAIN DESCRIPTION - ONSET: ONSET: ON-GOING

## 2020-11-27 ASSESSMENT — PAIN SCALES - GENERAL
PAINLEVEL_OUTOF10: 7
PAINLEVEL_OUTOF10: 8
PAINLEVEL_OUTOF10: 8

## 2020-11-27 ASSESSMENT — PAIN DESCRIPTION - FREQUENCY: FREQUENCY: CONTINUOUS

## 2020-11-27 ASSESSMENT — PAIN DESCRIPTION - LOCATION
LOCATION: ABDOMEN
LOCATION: ABDOMEN

## 2020-11-27 ASSESSMENT — PAIN DESCRIPTION - PROGRESSION: CLINICAL_PROGRESSION: NOT CHANGED

## 2020-11-27 NOTE — LETTER
[address] [address]  Phone: [phone]    No name on file. November 28, 2020     Patient: Xavier Bosworth   YOB: 1970   Date of Visit: 11/27/2020       To Whom It May Concern: It is my medical opinion that Serg Mejia may return to work on 1-2 weeks. If you have any questions or concerns, please don't hesitate to call. Sincerely,        No name on file.

## 2020-11-27 NOTE — ED NOTES
Assessment- Abdominal pain bilateral LQ, constant pain, pt denies N/V/D. Denies problems with urination. Pt denies any kind of trauma. Pt has bowel sounds x 4 quadrants. Abdomen is round and soft. Pt states he feels like his abdomen is swollen. Skin is pink warm and dry. Respirations unlabored. Lungs clear bilaterally. GCS 15. Pt last his last oral intake for liquid and solids is 1300 today.       Dorene To RN  11/27/20 [address] DIVYA Stoner  11/27/20 [id number]

## 2020-11-27 NOTE — ED NOTES
Pt pacing around room. Unable to sit still for more than a couple minutes. Urine specimen obtained and taken to lab.  Pt gowned for exam     Thirza Bence, RN  11/27/20 1685

## 2020-11-27 NOTE — LETTER
[facility]  [address] [address]  Phone: [phone]    No name on file. November 28, 2020     Patient: Alberto Davison   YOB: 1970   Date of Visit: 11/27/2020       To Whom It May Concern: It is my medical opinion that Vandy Ormond may return to work on 12/14/2020. If you have any questions or concerns, please don't hesitate to call. Sincerely,        No name on file.

## 2020-11-28 ENCOUNTER — ANESTHESIA (OUTPATIENT)
Dept: OPERATING ROOM | Age: 50
DRG: 343 | End: 2020-11-28
Payer: COMMERCIAL

## 2020-11-28 ENCOUNTER — ANESTHESIA EVENT (OUTPATIENT)
Dept: OPERATING ROOM | Age: 50
DRG: 343 | End: 2020-11-28
Payer: COMMERCIAL

## 2020-11-28 VITALS
DIASTOLIC BLOOD PRESSURE: 73 MMHG | HEART RATE: 75 BPM | BODY MASS INDEX: 31.5 KG/M2 | RESPIRATION RATE: 16 BRPM | SYSTOLIC BLOOD PRESSURE: 130 MMHG | OXYGEN SATURATION: 96 % | WEIGHT: 220 LBS | HEIGHT: 70 IN | TEMPERATURE: 97.9 F

## 2020-11-28 VITALS
OXYGEN SATURATION: 100 % | RESPIRATION RATE: 11 BRPM | DIASTOLIC BLOOD PRESSURE: 73 MMHG | SYSTOLIC BLOOD PRESSURE: 118 MMHG | TEMPERATURE: 97.3 F

## 2020-11-28 PROCEDURE — 3600000004 HC SURGERY LEVEL 4 BASE: Performed by: SURGERY

## 2020-11-28 PROCEDURE — 6360000002 HC RX W HCPCS: Performed by: ANESTHESIOLOGY

## 2020-11-28 PROCEDURE — 2500000003 HC RX 250 WO HCPCS: Performed by: SURGERY

## 2020-11-28 PROCEDURE — 2500000003 HC RX 250 WO HCPCS: Performed by: NURSE PRACTITIONER

## 2020-11-28 PROCEDURE — 6360000002 HC RX W HCPCS: Performed by: SURGERY

## 2020-11-28 PROCEDURE — 99222 1ST HOSP IP/OBS MODERATE 55: CPT | Performed by: NURSE PRACTITIONER

## 2020-11-28 PROCEDURE — 44970 LAPAROSCOPY APPENDECTOMY: CPT | Performed by: SURGERY

## 2020-11-28 PROCEDURE — 2580000003 HC RX 258: Performed by: NURSE PRACTITIONER

## 2020-11-28 PROCEDURE — 2580000003 HC RX 258: Performed by: SURGERY

## 2020-11-28 PROCEDURE — 6360000002 HC RX W HCPCS: Performed by: NURSE ANESTHETIST, CERTIFIED REGISTERED

## 2020-11-28 PROCEDURE — 2709999900 HC NON-CHARGEABLE SUPPLY: Performed by: SURGERY

## 2020-11-28 PROCEDURE — 3700000000 HC ANESTHESIA ATTENDED CARE: Performed by: SURGERY

## 2020-11-28 PROCEDURE — 7100000001 HC PACU RECOVERY - ADDTL 15 MIN: Performed by: SURGERY

## 2020-11-28 PROCEDURE — 0DTJ4ZZ RESECTION OF APPENDIX, PERCUTANEOUS ENDOSCOPIC APPROACH: ICD-10-PCS | Performed by: SURGERY

## 2020-11-28 PROCEDURE — 6360000002 HC RX W HCPCS: Performed by: NURSE PRACTITIONER

## 2020-11-28 PROCEDURE — 7100000000 HC PACU RECOVERY - FIRST 15 MIN: Performed by: SURGERY

## 2020-11-28 PROCEDURE — 3700000001 HC ADD 15 MINUTES (ANESTHESIA): Performed by: SURGERY

## 2020-11-28 PROCEDURE — 3600000014 HC SURGERY LEVEL 4 ADDTL 15MIN: Performed by: SURGERY

## 2020-11-28 PROCEDURE — 44970 LAPAROSCOPY APPENDECTOMY: CPT | Performed by: NURSE PRACTITIONER

## 2020-11-28 PROCEDURE — 2500000003 HC RX 250 WO HCPCS: Performed by: NURSE ANESTHETIST, CERTIFIED REGISTERED

## 2020-11-28 PROCEDURE — 94761 N-INVAS EAR/PLS OXIMETRY MLT: CPT

## 2020-11-28 PROCEDURE — 6370000000 HC RX 637 (ALT 250 FOR IP): Performed by: NURSE PRACTITIONER

## 2020-11-28 PROCEDURE — 2720000010 HC SURG SUPPLY STERILE: Performed by: SURGERY

## 2020-11-28 PROCEDURE — 88304 TISSUE EXAM BY PATHOLOGIST: CPT

## 2020-11-28 RX ORDER — DEXAMETHASONE SODIUM PHOSPHATE 4 MG/ML
INJECTION, SOLUTION INTRA-ARTICULAR; INTRALESIONAL; INTRAMUSCULAR; INTRAVENOUS; SOFT TISSUE PRN
Status: DISCONTINUED | OUTPATIENT
Start: 2020-11-28 | End: 2020-11-28 | Stop reason: SDUPTHER

## 2020-11-28 RX ORDER — PROPOFOL 10 MG/ML
INJECTION, EMULSION INTRAVENOUS PRN
Status: DISCONTINUED | OUTPATIENT
Start: 2020-11-28 | End: 2020-11-28 | Stop reason: SDUPTHER

## 2020-11-28 RX ORDER — PROMETHAZINE HYDROCHLORIDE 12.5 MG/1
12.5 TABLET ORAL EVERY 6 HOURS PRN
Status: DISCONTINUED | OUTPATIENT
Start: 2020-11-28 | End: 2020-11-28 | Stop reason: HOSPADM

## 2020-11-28 RX ORDER — ASPIRIN 81 MG/1
81 TABLET ORAL DAILY
Status: DISCONTINUED | OUTPATIENT
Start: 2020-11-28 | End: 2020-11-28 | Stop reason: HOSPADM

## 2020-11-28 RX ORDER — SODIUM CHLORIDE 0.9 % (FLUSH) 0.9 %
10 SYRINGE (ML) INJECTION EVERY 12 HOURS SCHEDULED
Status: DISCONTINUED | OUTPATIENT
Start: 2020-11-28 | End: 2020-11-28 | Stop reason: HOSPADM

## 2020-11-28 RX ORDER — HYDRALAZINE HYDROCHLORIDE 20 MG/ML
5 INJECTION INTRAMUSCULAR; INTRAVENOUS EVERY 10 MIN PRN
Status: DISCONTINUED | OUTPATIENT
Start: 2020-11-28 | End: 2020-11-28 | Stop reason: HOSPADM

## 2020-11-28 RX ORDER — SODIUM CHLORIDE 0.9 % (FLUSH) 0.9 %
10 SYRINGE (ML) INJECTION PRN
Status: DISCONTINUED | OUTPATIENT
Start: 2020-11-28 | End: 2020-11-28 | Stop reason: HOSPADM

## 2020-11-28 RX ORDER — HYDROCODONE BITARTRATE AND ACETAMINOPHEN 5; 325 MG/1; MG/1
1 TABLET ORAL EVERY 6 HOURS PRN
Status: DISCONTINUED | OUTPATIENT
Start: 2020-11-28 | End: 2020-11-28 | Stop reason: HOSPADM

## 2020-11-28 RX ORDER — FENTANYL CITRATE 50 UG/ML
INJECTION, SOLUTION INTRAMUSCULAR; INTRAVENOUS PRN
Status: DISCONTINUED | OUTPATIENT
Start: 2020-11-28 | End: 2020-11-28 | Stop reason: SDUPTHER

## 2020-11-28 RX ORDER — FENTANYL CITRATE 50 UG/ML
50 INJECTION, SOLUTION INTRAMUSCULAR; INTRAVENOUS EVERY 5 MIN PRN
Status: DISCONTINUED | OUTPATIENT
Start: 2020-11-28 | End: 2020-11-28 | Stop reason: HOSPADM

## 2020-11-28 RX ORDER — FENTANYL CITRATE 50 UG/ML
25 INJECTION, SOLUTION INTRAMUSCULAR; INTRAVENOUS EVERY 5 MIN PRN
Status: DISCONTINUED | OUTPATIENT
Start: 2020-11-28 | End: 2020-11-28 | Stop reason: HOSPADM

## 2020-11-28 RX ORDER — DOFETILIDE 0.25 MG/1
250 CAPSULE ORAL EVERY 12 HOURS SCHEDULED
Status: DISCONTINUED | OUTPATIENT
Start: 2020-11-28 | End: 2020-11-28 | Stop reason: HOSPADM

## 2020-11-28 RX ORDER — ONDANSETRON 2 MG/ML
INJECTION INTRAMUSCULAR; INTRAVENOUS PRN
Status: DISCONTINUED | OUTPATIENT
Start: 2020-11-28 | End: 2020-11-28 | Stop reason: SDUPTHER

## 2020-11-28 RX ORDER — MORPHINE SULFATE 2 MG/ML
1 INJECTION, SOLUTION INTRAMUSCULAR; INTRAVENOUS
Status: DISCONTINUED | OUTPATIENT
Start: 2020-11-27 | End: 2020-11-28 | Stop reason: HOSPADM

## 2020-11-28 RX ORDER — LIDOCAINE HYDROCHLORIDE 20 MG/ML
INJECTION, SOLUTION EPIDURAL; INFILTRATION; INTRACAUDAL; PERINEURAL PRN
Status: DISCONTINUED | OUTPATIENT
Start: 2020-11-28 | End: 2020-11-28 | Stop reason: SDUPTHER

## 2020-11-28 RX ORDER — ONDANSETRON 2 MG/ML
4 INJECTION INTRAMUSCULAR; INTRAVENOUS
Status: DISCONTINUED | OUTPATIENT
Start: 2020-11-28 | End: 2020-11-28 | Stop reason: HOSPADM

## 2020-11-28 RX ORDER — MIDAZOLAM HYDROCHLORIDE 1 MG/ML
INJECTION INTRAMUSCULAR; INTRAVENOUS PRN
Status: DISCONTINUED | OUTPATIENT
Start: 2020-11-28 | End: 2020-11-28 | Stop reason: SDUPTHER

## 2020-11-28 RX ORDER — GLYCOPYRROLATE 1 MG/5 ML
SYRINGE (ML) INTRAVENOUS PRN
Status: DISCONTINUED | OUTPATIENT
Start: 2020-11-28 | End: 2020-11-28 | Stop reason: SDUPTHER

## 2020-11-28 RX ORDER — ONDANSETRON 2 MG/ML
4 INJECTION INTRAMUSCULAR; INTRAVENOUS EVERY 6 HOURS PRN
Status: DISCONTINUED | OUTPATIENT
Start: 2020-11-28 | End: 2020-11-28 | Stop reason: HOSPADM

## 2020-11-28 RX ORDER — SODIUM CHLORIDE 9 MG/ML
INJECTION, SOLUTION INTRAVENOUS CONTINUOUS
Status: DISCONTINUED | OUTPATIENT
Start: 2020-11-28 | End: 2020-11-28 | Stop reason: HOSPADM

## 2020-11-28 RX ORDER — SODIUM CHLORIDE 9 MG/ML
INJECTION, SOLUTION INTRAVENOUS CONTINUOUS
Status: DISCONTINUED | OUTPATIENT
Start: 2020-11-28 | End: 2020-11-28 | Stop reason: SDUPTHER

## 2020-11-28 RX ORDER — METOPROLOL SUCCINATE 25 MG/1
25 TABLET, EXTENDED RELEASE ORAL DAILY
Status: DISCONTINUED | OUTPATIENT
Start: 2020-11-28 | End: 2020-11-28 | Stop reason: HOSPADM

## 2020-11-28 RX ORDER — HYDROCODONE BITARTRATE AND ACETAMINOPHEN 5; 325 MG/1; MG/1
2 TABLET ORAL EVERY 6 HOURS PRN
Status: DISCONTINUED | OUTPATIENT
Start: 2020-11-28 | End: 2020-11-28 | Stop reason: HOSPADM

## 2020-11-28 RX ORDER — NEOSTIGMINE METHYLSULFATE 5 MG/5 ML
SYRINGE (ML) INTRAVENOUS PRN
Status: DISCONTINUED | OUTPATIENT
Start: 2020-11-28 | End: 2020-11-28 | Stop reason: SDUPTHER

## 2020-11-28 RX ORDER — LABETALOL HYDROCHLORIDE 5 MG/ML
5 INJECTION, SOLUTION INTRAVENOUS EVERY 10 MIN PRN
Status: DISCONTINUED | OUTPATIENT
Start: 2020-11-28 | End: 2020-11-28 | Stop reason: HOSPADM

## 2020-11-28 RX ORDER — HYDROCODONE BITARTRATE AND ACETAMINOPHEN 5; 325 MG/1; MG/1
1 TABLET ORAL EVERY 4 HOURS PRN
Qty: 20 TABLET | Refills: 0 | Status: SHIPPED | OUTPATIENT
Start: 2020-11-28 | End: 2020-12-03

## 2020-11-28 RX ORDER — BUPIVACAINE HYDROCHLORIDE 5 MG/ML
INJECTION, SOLUTION EPIDURAL; INTRACAUDAL
Status: COMPLETED | OUTPATIENT
Start: 2020-11-28 | End: 2020-11-28

## 2020-11-28 RX ADMIN — MORPHINE SULFATE 1 MG: 2 INJECTION, SOLUTION INTRAMUSCULAR; INTRAVENOUS at 01:07

## 2020-11-28 RX ADMIN — FENTANYL CITRATE 50 MCG: 50 INJECTION INTRAMUSCULAR; INTRAVENOUS at 08:02

## 2020-11-28 RX ADMIN — MORPHINE SULFATE 1 MG: 2 INJECTION, SOLUTION INTRAMUSCULAR; INTRAVENOUS at 10:07

## 2020-11-28 RX ADMIN — SODIUM CHLORIDE: 9 INJECTION, SOLUTION INTRAVENOUS at 04:40

## 2020-11-28 RX ADMIN — PROPOFOL 200 MG: 10 INJECTION, EMULSION INTRAVENOUS at 07:33

## 2020-11-28 RX ADMIN — LIDOCAINE HYDROCHLORIDE 100 MG: 20 INJECTION, SOLUTION EPIDURAL; INFILTRATION; INTRACAUDAL; PERINEURAL at 07:33

## 2020-11-28 RX ADMIN — METRONIDAZOLE 500 MG: 500 INJECTION, SOLUTION INTRAVENOUS at 08:05

## 2020-11-28 RX ADMIN — SODIUM CHLORIDE: 9 INJECTION, SOLUTION INTRAVENOUS at 08:45

## 2020-11-28 RX ADMIN — MIDAZOLAM 2 MG: 1 INJECTION INTRAMUSCULAR; INTRAVENOUS at 07:24

## 2020-11-28 RX ADMIN — SODIUM CHLORIDE, PRESERVATIVE FREE 10 ML: 5 INJECTION INTRAVENOUS at 10:40

## 2020-11-28 RX ADMIN — FENTANYL CITRATE 50 MCG: 50 INJECTION INTRAMUSCULAR; INTRAVENOUS at 08:43

## 2020-11-28 RX ADMIN — METOPROLOL SUCCINATE 25 MG: 25 TABLET, EXTENDED RELEASE ORAL at 10:39

## 2020-11-28 RX ADMIN — SODIUM CHLORIDE: 9 INJECTION, SOLUTION INTRAVENOUS at 07:46

## 2020-11-28 RX ADMIN — DEXTROSE MONOHYDRATE 2 G: 50 INJECTION, SOLUTION INTRAVENOUS at 07:40

## 2020-11-28 RX ADMIN — CEFAZOLIN 2 G: 1 INJECTION, POWDER, FOR SOLUTION INTRAMUSCULAR; INTRAVENOUS at 15:43

## 2020-11-28 RX ADMIN — Medication 0.4 MG: at 08:12

## 2020-11-28 RX ADMIN — SODIUM CHLORIDE: 9 INJECTION, SOLUTION INTRAVENOUS at 01:07

## 2020-11-28 RX ADMIN — FENTANYL CITRATE 50 MCG: 50 INJECTION INTRAMUSCULAR; INTRAVENOUS at 07:51

## 2020-11-28 RX ADMIN — SUGAMMADEX 200 MG: 100 INJECTION, SOLUTION INTRAVENOUS at 08:15

## 2020-11-28 RX ADMIN — FENTANYL CITRATE 100 MCG: 50 INJECTION INTRAMUSCULAR; INTRAVENOUS at 07:33

## 2020-11-28 RX ADMIN — Medication 2 MG: at 08:12

## 2020-11-28 RX ADMIN — ONDANSETRON 4 MG: 2 INJECTION INTRAMUSCULAR; INTRAVENOUS at 07:51

## 2020-11-28 RX ADMIN — MORPHINE SULFATE 1 MG: 2 INJECTION, SOLUTION INTRAMUSCULAR; INTRAVENOUS at 15:51

## 2020-11-28 RX ADMIN — METRONIDAZOLE 500 MG: 500 INJECTION, SOLUTION INTRAVENOUS at 10:39

## 2020-11-28 RX ADMIN — ASPIRIN 81 MG: 81 TABLET, COATED ORAL at 10:39

## 2020-11-28 RX ADMIN — METRONIDAZOLE 500 MG: 500 INJECTION, SOLUTION INTRAVENOUS at 17:07

## 2020-11-28 RX ADMIN — DOFETILIDE 250 MCG: 0.25 CAPSULE ORAL at 13:07

## 2020-11-28 RX ADMIN — HYDROCODONE BITARTRATE AND ACETAMINOPHEN 2 TABLET: 5; 325 TABLET ORAL at 13:09

## 2020-11-28 RX ADMIN — DEXAMETHASONE SODIUM PHOSPHATE 4 MG: 4 INJECTION, SOLUTION INTRAMUSCULAR; INTRAVENOUS at 07:41

## 2020-11-28 ASSESSMENT — PULMONARY FUNCTION TESTS
PIF_VALUE: 22
PIF_VALUE: 25
PIF_VALUE: 20
PIF_VALUE: 0
PIF_VALUE: 26
PIF_VALUE: 18
PIF_VALUE: 19
PIF_VALUE: 32
PIF_VALUE: 31
PIF_VALUE: 19
PIF_VALUE: 18
PIF_VALUE: 32
PIF_VALUE: 30
PIF_VALUE: 19
PIF_VALUE: 31
PIF_VALUE: 18
PIF_VALUE: 1
PIF_VALUE: 19
PIF_VALUE: 32
PIF_VALUE: 23
PIF_VALUE: 18
PIF_VALUE: 21
PIF_VALUE: 19
PIF_VALUE: 31
PIF_VALUE: 0
PIF_VALUE: 19
PIF_VALUE: 5
PIF_VALUE: 19
PIF_VALUE: 1
PIF_VALUE: 19
PIF_VALUE: 1
PIF_VALUE: 19
PIF_VALUE: 0
PIF_VALUE: 30
PIF_VALUE: 19
PIF_VALUE: 2
PIF_VALUE: 18
PIF_VALUE: 27
PIF_VALUE: 20
PIF_VALUE: 32
PIF_VALUE: 30
PIF_VALUE: 32
PIF_VALUE: 18
PIF_VALUE: 2
PIF_VALUE: 8
PIF_VALUE: 21

## 2020-11-28 ASSESSMENT — PAIN SCALES - GENERAL
PAINLEVEL_OUTOF10: 9
PAINLEVEL_OUTOF10: 7
PAINLEVEL_OUTOF10: 7
PAINLEVEL_OUTOF10: 4
PAINLEVEL_OUTOF10: 8
PAINLEVEL_OUTOF10: 5
PAINLEVEL_OUTOF10: 8
PAINLEVEL_OUTOF10: 7
PAINLEVEL_OUTOF10: 0

## 2020-11-28 ASSESSMENT — PAIN DESCRIPTION - PAIN TYPE
TYPE: SURGICAL PAIN

## 2020-11-28 ASSESSMENT — PAIN DESCRIPTION - PROGRESSION
CLINICAL_PROGRESSION: GRADUALLY IMPROVING
CLINICAL_PROGRESSION: GRADUALLY IMPROVING
CLINICAL_PROGRESSION: NOT CHANGED

## 2020-11-28 ASSESSMENT — PAIN DESCRIPTION - FREQUENCY
FREQUENCY: CONTINUOUS

## 2020-11-28 ASSESSMENT — PAIN DESCRIPTION - LOCATION
LOCATION: ABDOMEN

## 2020-11-28 ASSESSMENT — PAIN DESCRIPTION - ONSET
ONSET: ON-GOING
ONSET: ON-GOING

## 2020-11-28 ASSESSMENT — PAIN DESCRIPTION - ORIENTATION
ORIENTATION: LOWER
ORIENTATION: LOWER

## 2020-11-28 ASSESSMENT — PAIN DESCRIPTION - DESCRIPTORS
DESCRIPTORS: BURNING;PRESSURE
DESCRIPTORS: BURNING;PRESSURE

## 2020-11-28 NOTE — PROGRESS NOTES
8004- pt arrived from OR and placed on all PACU monitoring devices. Report received from [facility]. and DR. Rush. Respirations even and unlabored. Abdominal incision sites well approximated with surgical glue dry and intact. Abdomen soft. VSS  0905- recovery complete, report called to [facility]. Preparing pt for transfer. 0911-pt to room 4116 with clinical transport.

## 2020-11-28 NOTE — ANESTHESIA POSTPROCEDURE EVALUATION
Department of Anesthesiology  Postprocedure Note    Patient: Clementina Dee  MRN: [id number]  YOB: 1970  Date of evaluation: 11/28/2020  Time:  8:31 AM     Procedure Summary     Date:  11/28/20 Room / Location:  [address] [place] [address]    Anesthesia Start:  8992 Anesthesia Stop:      Procedure:  APPENDECTOMY LAPAROSCOPIC (N/A Abdomen) Diagnosis:  (appendicitis)    Surgeon:  Nikolai Lance MD Responsible Provider:  MATTIE Gil CRNA    Anesthesia Type:  general ASA Status:  3 - Emergent          Anesthesia Type: general    Michaela Phase I: Michaela Score: 7    Mcihaela Phase II:      Last vitals: Reviewed and per EMR flowsheets.        Anesthesia Post Evaluation    Patient location during evaluation: PACU  Patient participation: complete - patient participated  Level of consciousness: awake  Pain score: 1  Airway patency: patent  Nausea & Vomiting: no nausea and no vomiting  Complications: no  Cardiovascular status: hemodynamically stable  Respiratory status: acceptable  Hydration status: euvolemic

## 2020-11-28 NOTE — ED NOTES
Dr Murdock Ben in to talk with pt about test results and plan of care.      Luis M Rosales, RN  11/27/20 7527

## 2020-11-28 NOTE — DISCHARGE SUMMARY
[facility] - General Surgery    Patient ID:  Name:Brannon Ann  Date: 2020 4:06 PM   MRN#: [id number] :1970   Admission Date:2020 Age/Sex:50 y.o. male       Discharge date and time: 20    Admitting Physician: Osman Leung MD     Discharge Physician: same    Admission Diagnoses: Appendicitis [K37]    Discharge Diagnoses: Active Problems:    Appendicitis  Resolved Problems:    * No resolved hospital problems. *       Admission Condition: stable     Discharged Condition: stable    Indication for Admission: Active Problems:    Appendicitis  Resolved Problems:    * No resolved hospital problems. *       Hospital Course:   Fuentes Caraballo is a 44 y.o. male presented with clinical signs and symptoms consistent with acute appendicitis. He underwent laparoscopic appendectomy. Postoperatively he did well; then progressed to tolerating a diet, had adequate pain control, and felt ready for discharge home. Consults: none    Significant Diagnostic Studies: see chart    Treatments: surgery/procedure: laparoscopic appendectomy    Disposition: Home or Self Care    Patient Instructions:    [facility] Medication Instructions [id number]    Printed on:20 1606   Medication Information                      ASPIRIN 81 PO  Take 81 mg by mouth daily             Cholecalciferol (VITAMIN D3 PO)  Take 1 tablet by mouth daily             dofetilide (TIKOSYN) 250 MCG capsule  Take 1 capsule by mouth every 12 hours             HYDROcodone-acetaminophen (NORCO) 5-325 MG per tablet  Take 1 tablet by mouth every 4 hours as needed for Pain for up to 5 days. Intended supply: 7 days.  Take lowest dose possible to manage pain             metoprolol succinate (TOPROL XL) 50 MG extended release tablet  Take 0.5 tablets by mouth daily             Multiple Vitamins-Minerals (MULTIVITAMIN ADULT PO)  Take by mouth               No discharge procedures on

## 2020-11-28 NOTE — OP NOTE
Operative Note      Patient: Chris Dacosta  YOB: 1970  MRN: [id number]    Date of Procedure: 11/28/2020    Pre-Op Diagnosis: appendicitis    Post-Op Diagnosis: Acute non perforated appendicitis       Procedure(s):  APPENDECTOMY LAPAROSCOPIC    Surgeon(s):  Nayeli Lucas MD    First Assistant: SONDRA Hairston  The use of a first assistant was necessary for the proper positioning, prepping, and draping of the patient, intraoperative retraction and suctioning for visualization, passing sutures and implants, stapling bowel and vessels using devises when necessary. Anesthesia: General    Detailed Description of Procedure:  Patient was brought to the operating room and placed supine on the operating table. After induction of general endotracheal anesthesia, the patients abdomen was prepped and draped in the usual fashion. A supaumbilical incision was made and taken down through the skin and subcutaneous tissue. The visiport was used to enter the abdomen and it was inflated to 15 lb CO2. The suprapubic and middle trocars were inserted under direct vision. The appendix was located in the right colic gutter. The mesoappendix was transected with the ligasure. The base of the appendix was transected with the SYLVESTER stapler. The appendix was placed in an extraction bag and brought out through the middle incision. The abdomen was irrigated free of all blood and cloudy fluid. A piece of Orbie Bola was placed in the area of the mesoappendix dissection. At the end of the procedure there was no bleeding or leakage of stool. The trocars were removed under direct vision and as much CO2 as possible was allowed to escape. The incisions were close with staples and anesthetized with 0.5% marcaine plain.     Estimated Blood Loss (mL): less than 50     Fluid: 4309 cc    Complications: None    Specimens:   ID Type Source Tests Collected by Time Destination   A : Appendix Tissue Appendix SURGICAL PATHOLOGY

## 2020-11-28 NOTE — ANESTHESIA PRE PROCEDURE
Department of Anesthesiology  Preprocedure Note       Name:  Sahshank Mercer   Age:  44 y.o.  :  1970                                          MRN:  [id number]         Date:  2020      Surgeon: Taniya Alicia):  Felix Wall MD    Procedure: Procedure(s):  APPENDECTOMY LAPAROSCOPIC    Medications prior to admission:   Prior to Admission medications    Medication Sig Start Date End Date Taking? Authorizing Provider   Cholecalciferol (VITAMIN D3 PO) Take 1 tablet by mouth daily   Yes Historical Provider, MD   ASPIRIN 81 PO Take 81 mg by mouth daily   Yes Historical Provider, MD   metoprolol succinate (TOPROL XL) 50 MG extended release tablet Take 0.5 tablets by mouth daily 19  Yes MATTIE Boateng CNP   dofetilide (TIKOSYN) 250 MCG capsule Take 1 capsule by mouth every 12 hours 19  Yes MATTIE Boateng CNP   Multiple Vitamins-Minerals (MULTIVITAMIN ADULT PO) Take by mouth   Yes Historical Provider, MD       Current medications:    Current Facility-Administered Medications   Medication Dose Route Frequency Provider Last Rate Last Dose    0.9 % sodium chloride infusion   Intravenous Continuous Felix Wall  mL/hr at 20 010      morphine (PF) injection 1 mg  1 mg Intravenous Q2H PRN Felix Wall MD   1 mg at 20 010    ceFAZolin (ANCEF) 2 g in dextrose 5 % 100 mL IVPB  2 g Intravenous On Call to [address] MD Navid        metronidazole (FLAGYL) 500 mg in NaCl 100 mL IVPB premix  500 mg Intravenous On Call to [address] MD Navid         Facility-Administered Medications Ordered in Other Encounters   Medication Dose Route Frequency Provider Last Rate Last Dose    midazolam (VERSED) injection 2 mg  2 mg Intravenous Once Agustin Sullivan MD           Allergies:     Allergies   Allergen Reactions    Codeine Nausea Only    Pcn [Penicillins] Rash    Sulfa Antibiotics Rash       Problem List:    Patient Active Problem 0615 11/28/20 0650   BP: 135/74  (!) 140/70 111/68   Pulse: 86  72 69   Resp: 18  16 16   Temp: 36.4 °C (97.5 °F)  36.5 °C (97.7 °F)    TempSrc: Oral      SpO2:   97% 97%   Weight:  220 lb (99.8 kg)     Height:  5' 10\" (1.778 m)                                                BP Readings from Last 3 Encounters:   11/28/20 111/68   11/27/20 (!) 154/88   08/19/20 118/80       NPO Status: Time of last liquid consumption: 1300                        Time of last solid consumption: 1300                        Date of last liquid consumption: 11/27/20                        Date of last solid food consumption: 11/27/20    BMI:   Wt Readings from Last 3 Encounters:   11/27/20 220 lb (99.8 kg)   11/27/20 220 lb (99.8 kg)   08/19/20 226 lb 12.8 oz (102.9 kg)     Body mass index is 31.57 kg/m². CBC:   Lab Results   Component Value Date    WBC 11.1 11/27/2020    RBC 4.84 11/27/2020    HGB 14.6 11/27/2020    HCT 42.8 11/27/2020    MCV 88.4 11/27/2020    RDW 11.9 11/27/2020     11/27/2020       CMP:   Lab Results   Component Value Date     11/27/2020    K 3.7 11/27/2020     11/27/2020    CO2 24 11/27/2020    BUN 15 11/27/2020    CREATININE 1.0 11/27/2020    GFRAA >60 11/27/2020    LABGLOM >60 11/27/2020    GLUCOSE 116 11/27/2020    PROT 7.0 11/27/2020    CALCIUM 9.5 11/27/2020    BILITOT 0.5 11/27/2020    ALKPHOS 72 11/27/2020    AST 19 11/27/2020    ALT 37 11/27/2020       POC Tests: No results for input(s): POCGLU, POCNA, POCK, POCCL, POCBUN, POCHEMO, POCHCT in the last 72 hours.     Coags:   Lab Results   Component Value Date    PROTIME 20.8 05/23/2019    INR 1.80 05/23/2019    APTT 50.9 02/07/2019       HCG (If Applicable): No results found for: PREGTESTUR, PREGSERUM, HCG, HCGQUANT     ABGs:   Lab Results   Component Value Date    PO2ART 255.8 01/15/2019    QGU2KPB 39.6 01/15/2019    BJH8LUX 25.4 01/15/2019        Type & Screen (If Applicable):  No results found for: LABABO, LABRH    Drug/Infectious Status (If Applicable):  No results found for: HIV, HEPCAB    COVID-19 Screening (If Applicable):   Lab Results   Component Value Date    COVID19 NOT DETECTED 11/27/2020         Anesthesia Evaluation  Patient summary reviewed no history of anesthetic complications:   Airway: Mallampati: II        Dental: normal exam         Pulmonary:   (+) decreased breath sounds,                             Cardiovascular:  Exercise tolerance: good (>4 METS),   (+) CAD:, dysrhythmias: atrial flutter and atrial fibrillation,          Beta Blocker:  Dose within 24 Hrs         Neuro/Psych:   (+) headaches: cluster headaches,             GI/Hepatic/Renal:             Endo/Other:                     Abdominal:           Vascular:                                        Anesthesia Plan      general     ASA 3 - emergent       Induction: intravenous. MIPS: Postoperative opioids intended and Prophylactic antiemetics administered. Anesthetic plan and risks discussed with patient. Plan discussed with CRNA. Attending anesthesiologist reviewed and agrees with Pre Eval content        Pre Anesthesia Evaluation complete. Anesthesia plan, risks, benefits, alternatives, and personnel discussed with patient and/or legal guardian. Patient and/or legal guardian verbalized an understanding and agreed to proceed. Anesthesia plan discussed with care team members and agreed upon.   MATTIE Brown - CRNA  11/28/2020

## 2020-11-28 NOTE — ANESTHESIA PRE PROCEDURE
List   Diagnosis Code    Atrial fibrillation with rapid ventricular response (HCC) I48.91    Chest discomfort R07.89    Chronic tension headaches G44.229    Nausea & vomiting R11.2    LLQ abdominal pain R10.32    Diarrhea R19.7    S/P ablation of atrial fibrillation Z98.890, Z86.79    S/P ablation of atrial flutter Z98.890, Z86.79    Atypical chest pain R07.89    Atrial flutter (HCC) I48.92    Atrial fibrillation by electrocardiogram ([facility] [address] I48.91    Visit for monitoring Tikosyn therapy Z51.81, Z79.899    History of cardioversion Z98.890    Appendicitis K37       Past Medical History:        Diagnosis Date    Arrhythmia 11/2014    Atrial fib    Arthritis     right shoulder    Atrial fibrillation (HCC)     CAD (coronary artery disease)     H/O cardiovascular stress test 12/12/2014    cardiolite-normal, no ischemia    H/O echocardiogram 4/13/15    EF 50-55% Mildly dilated left atrium. Borderline sized right ventricle.  Trace MR & TR.    History of echocardiogram 03/04/2020    EF 55-60%, Left atrium mildly dilated, no pericardial effusion, no significant valvular disease or diastolic dysfunction       Past Surgical History:        Procedure Laterality Date    ACHILLES TENDON SURGERY  2005    repair torn [place]'s tendon    BACK SURGERY      CARDIOVERSION  11/17/14    HERNIA REPAIR      OTHER SURGICAL HISTORY  3/10/15    cardiac ablation for a fib/ a flutter    SHOULDER SURGERY  2010    right shoulder rotator cuff surgery       Social History:    Social History     Tobacco Use    Smoking status: Never Smoker    Smokeless tobacco: Current User     Types: Chew    Tobacco comment: Chews tobacco   Reviewed 2/10/16   Substance Use Topics    Alcohol use: Yes     Comment: only occasionally                                Ready to quit: Not Answered  Counseling given: Not Answered  Comment: Chews tobacco   Reviewed 2/10/16      Vital Signs (Current):   Vitals:    11/27/20 2315 11/27/20 2330 11/28/20 found for: HIV, HEPCAB    COVID-19 Screening (If Applicable):   Lab Results   Component Value Date    COVID19 NOT DETECTED 11/27/2020         Anesthesia Evaluation  Patient summary reviewed no history of anesthetic complications:   Airway:         Dental:          Pulmonary:                              Cardiovascular:  Exercise tolerance: good (>4 METS),   (+) CAD:, dysrhythmias: atrial flutter and atrial fibrillation,          Beta Blocker:  Dose within 24 Hrs         Neuro/Psych:   (+) headaches: cluster headaches,             GI/Hepatic/Renal:             Endo/Other:                     Abdominal:           Vascular:                                        Anesthesia Plan      general     ASA 3 - emergent       Induction: intravenous. MIPS: Postoperative opioids intended and Prophylactic antiemetics administered. Anesthetic plan and risks discussed with patient. Pre Anesthesia Evaluation complete. Anesthesia plan, risks, benefits, alternatives, and personnel discussed with patient and/or legal guardian. Patient and/or legal guardian verbalized an understanding and agreed to proceed. Anesthesia plan discussed with care team members and agreed upon.   MATTIE Mon - CELINA  11/28/2020

## 2020-11-28 NOTE — H&P
PATIENT NAME: Joya Ngo Mikki OF BIRTH: 1970    ADMISSION DATE: 11/28/2020. TODAY'S DATE: 11/28/2020    CHIEF COMPLAINT:   RLQ pain    HISTORY OF PRESENT ILLNESS:  The patient is a 44 y.o. male  who presents with right lower quadrant pain that started Thursday night. He states the pain is diffuse however the pain is worse in the RLQ. He states he was very nauseated yesterday. He denies vomiting, diarrhea, constipation, urinary symptoms, fever, chills. He has surgical history of umbilical hernia repair in 2005, no other abdominal surgeries. Past Medical History:        Diagnosis Date    Arrhythmia 11/2014    Atrial fib    Arthritis     right shoulder    Atrial fibrillation (HCC)     CAD (coronary artery disease)     H/O cardiovascular stress test 12/12/2014    cardiolite-normal, no ischemia    H/O echocardiogram 4/13/15    EF 50-55% Mildly dilated left atrium. Borderline sized right ventricle.  Trace MR & TR.    History of echocardiogram 03/04/2020    EF 55-60%, Left atrium mildly dilated, no pericardial effusion, no significant valvular disease or diastolic dysfunction       Past Surgical History:        Procedure Laterality Date    ACHILLES TENDON SURGERY  2005    repair torn [place]'s tendon    BACK SURGERY      CARDIOVERSION  11/17/14    HERNIA REPAIR      OTHER SURGICAL HISTORY  3/10/15    cardiac ablation for a fib/ a flutter    SHOULDER SURGERY  2010    right shoulder rotator cuff surgery       Medications Prior to Admission:   Medications Prior to Admission: Cholecalciferol (VITAMIN D3 PO), Take 1 tablet by mouth daily  ASPIRIN 81 PO, Take 81 mg by mouth daily  metoprolol succinate (TOPROL XL) 50 MG extended release tablet, Take 0.5 tablets by mouth daily  dofetilide (TIKOSYN) 250 MCG capsule, Take 1 capsule by mouth every 12 hours  Multiple Vitamins-Minerals (MULTIVITAMIN ADULT PO), Take by mouth    Allergies:  Codeine; Pcn [penicillins]; and Sulfa antibiotics    Social History:   Social History     Socioeconomic History    Marital status:      Spouse name: Not on file    Number of children: Not on file    Years of education: Not on file    Highest education level: Not on file   Occupational History    Not on file   Social Needs    Financial resource strain: Not on file    Food insecurity     Worry: Not on file     Inability: Not on file    Transportation needs     Medical: Not on file     Non-medical: Not on file   Tobacco Use    Smoking status: Never Smoker    Smokeless tobacco: Current User     Types: Chew    Tobacco comment: Chews tobacco   Reviewed 2/10/16   Substance and Sexual Activity    Alcohol use: Yes     Comment: only occasionally    Drug use: No    Sexual activity: Not on file   Lifestyle    Physical activity     Days per week: Not on file     Minutes per session: Not on file    Stress: Not on file   Relationships    Social connections     Talks on phone: Not on file     Gets together: Not on file     Attends Synagogue service: Not on file     Active member of club or organization: Not on file     Attends meetings of clubs or organizations: Not on file     Relationship status: Not on file    Intimate partner violence     Fear of current or ex partner: Not on file     Emotionally abused: Not on file     Physically abused: Not on file     Forced sexual activity: Not on file   Other Topics Concern    Not on file   Social History Narrative    Not on file       Family History:       Problem Relation Age of Onset    Cancer Father     Diabetes Father        REVIEW OF SYSTEMS:    CONSTITUTIONAL:  negative for  fevers, chills and weight loss  HEENT:  negative  CARDIOVASCULAR:  negative for  chest pain, dyspnea, palpitations, edema  GASTROINTESTINAL:  (+) abdominal pain and nausea.   Negative for vomiting, change in bowel habits, diarrhea, constipation and abdominal distention  GENITOURINARY:  negative  HEMATOLOGIC/LYMPHATIC:  negative for easy bruising, bleeding and lymphadenopathy  ENDOCRINE:  negative    PHYSICAL EXAM:    VITALS:  /68   Pulse 69   Temp 97.7 °F (36.5 °C)   Resp 16   Ht 5' 10\" (1.778 m)   Wt 220 lb (99.8 kg)   SpO2 97%   BMI 31.57 kg/m²   CONSTITUTIONAL:  awake, alert, no apparent distress  ENT:  normocepalic, without obvious abnormality  NECK:  supple, symmetrical, trachea midline  LUNGS:  clear to auscultation  CARDIOVASCULAR:  regular rate and rhythm  GASTROINTESTINAL;   soft, non-distended, tenderness noted diffusely but greater in the RLQ  MUSCULOSKELETAL:  0+ pitting edema lower extremities  NEUROLOGIC:  Mental Status Exam:  Level of Alertness:   awake  Orientation:   person, place, time    DATA:  CBC with Differential:    Lab Results   Component Value Date    WBC 11.1 11/27/2020    RBC 4.84 11/27/2020    HGB 14.6 11/27/2020    HCT 42.8 11/27/2020     11/27/2020    MCV 88.4 11/27/2020    MCH 30.2 11/27/2020    MCHC 34.1 11/27/2020    RDW 11.9 11/27/2020    SEGSPCT 74.0 11/27/2020    LYMPHOPCT 16.4 11/27/2020    MONOPCT 7.5 11/27/2020    BASOPCT 0.4 11/27/2020    MONOSABS 0.8 11/27/2020    LYMPHSABS 1.8 11/27/2020    EOSABS 0.1 11/27/2020    BASOSABS 0.0 11/27/2020    DIFFTYPE AUTOMATED DIFFERENTIAL 11/27/2020     CMP:    Lab Results   Component Value Date     11/27/2020    K 3.7 11/27/2020     11/27/2020    CO2 24 11/27/2020    BUN 15 11/27/2020    CREATININE 1.0 11/27/2020    GFRAA >60 11/27/2020    LABGLOM >60 11/27/2020    GLUCOSE 116 11/27/2020    PROT 7.0 11/27/2020    LABALBU 4.4 11/27/2020    CALCIUM 9.5 11/27/2020    BILITOT 0.5 11/27/2020    ALKPHOS 72 11/27/2020    AST 19 11/27/2020    ALT 37 11/27/2020       Radiology Reviewed    ASSESSMENT AND PLAN: Acute appendicitis - will plan laparoscopic appendectomy in OR. The risks, benefits and alternatives to the planned procedure were discussed. Patient expressed an understanding and is willing to proceed.      The patient was counseled at length about the risks of sowmya Covid-19 during their perioperative period and any recovery window from their procedure. The patient was made aware that sowmya Covid-19  may worsen their prognosis for recovering from their procedure  and lend to a higher morbidity and/or mortality risk. All material risks, benefits, and reasonable alternatives including postponing the procedure were discussed. The patient does wish to proceed with the procedure at this time.     Jac Mims, APRN-CNP

## 2020-11-28 NOTE — PROGRESS NOTES
Alert and oriented. Independent with ambulation. Skin assessment completed with Sary Hamm. Skin is warm dry and intact. No skin issues noted. Will continue to monitor.

## 2020-11-28 NOTE — ED PROVIDER NOTES
Emergency Department Encounter  Location: [address]    Patient: Gregory Jung  MRN: [id number]  : 1970  Date of evaluation: 2020  ED Provider: Fay Witt DO, FACEP    Chief Complaint:    Abdominal Pain (states pain all across lower abdomen which started last night. states has had some diarrhea. states nauseated but no vomiting)    [place]:  Gregory Jung is a 44 y.o. male that presents to the emergency department with complaints of abdominal pain that started last night. The patient is complaining of lower abdominal pain and generalized abdominal pain throughout his abdomen. He states that he is nauseated without vomiting. He states that he has a lot of pressure and feels like he needs to have a bowel movement when he tries to go he just has a very small amount of formed stool. He is passing gas. He denies any diarrhea. He denies any urinary symptoms. He states he feels hot but has not had a fever. He denies cough or sore throat. He denies loss of smell or taste. He has a history of a umbilical hernia repair but no other abdominal surgeries. He states his pain is 8 out of 10 and worse when he gets up and moves around. He states on the drive up. Was very rough and caused him a lot of discomfort. ROS - see HPI, below listed is current ROS at time of my eval:  At least 10 systems reviewed and otherwise acutely negative except as in the [address]   General:  No fevers, no chills, no weakness  Eyes:  No recent vison changes, no discharge  ENT:  No sore throat, no nasal congestion, no hearing changes  Cardiovascular:  No chest pain, no palpitations  Respiratory:  No shortness of breath, no cough, no wheezing  Gastrointestinal:  positive for lower abdominal pain with nausea no vomiting, no diarrhea  Musculoskeletal:  No muscle pain, no joint pain  Skin:  No rash, no pruritis, no easy bruising  Neurologic:  No speech problems, no headache, no extremity numbness, no extremity tingling, no extremity weakness  Psychiatric:  No anxiety  Genitourinary:  No dysuria, no hematuria  Endocrine:  No unexpected weight gain, no unexpected weight loss  Extremities:  no edema, no pain    Past Medical History:   Diagnosis Date    Arrhythmia 11/2014    Atrial fib    Arthritis     right shoulder    Atrial fibrillation (HCC)     CAD (coronary artery disease)     H/O cardiovascular stress test 12/12/2014    cardiolite-normal, no ischemia    H/O echocardiogram 4/13/15    EF 50-55% Mildly dilated left atrium. Borderline sized right ventricle.  Trace MR & TR.    History of echocardiogram 03/04/2020    EF 55-60%, Left atrium mildly dilated, no pericardial effusion, no significant valvular disease or diastolic dysfunction     Past Surgical History:   Procedure Laterality Date    ACHILLES TENDON SURGERY  2005    repair torn Jcarlos's tendon    BACK SURGERY      CARDIOVERSION  11/17/14    HERNIA REPAIR      OTHER SURGICAL HISTORY  3/10/15    cardiac ablation for a fib/ a flutter    SHOULDER SURGERY  2010    right shoulder rotator cuff surgery     Family History   Problem Relation Age of Onset    Cancer Father     Diabetes Father      Social History     Socioeconomic History    Marital status:      Spouse name: Not on file    Number of children: Not on file    Years of education: Not on file    Highest education level: Not on file   Occupational History    Not on file   Social Needs    Financial resource strain: Not on file    Food insecurity     Worry: Not on file     Inability: Not on file    Transportation needs     Medical: Not on file     Non-medical: Not on file   Tobacco Use    Smoking status: Never Smoker    Smokeless tobacco: Current User     Types: Chew    Tobacco comment: Chews tobacco   Reviewed 2/10/16   Substance and Sexual Activity    Alcohol use: Yes     Comment: only occasionally    Drug use: No    Sexual activity: Not on file   Lifestyle  Physical activity     Days per week: Not on file     Minutes per session: Not on file    Stress: Not on file   Relationships    Social connections     Talks on phone: Not on file     Gets together: Not on file     Attends Christianity service: Not on file     Active member of club or organization: Not on file     Attends meetings of clubs or organizations: Not on file     Relationship status: Not on file    Intimate partner violence     Fear of current or ex partner: Not on file     Emotionally abused: Not on file     Physically abused: Not on file     Forced sexual activity: Not on file   Other Topics Concern    Not on file   Social History Narrative    Not on file     Current Facility-Administered Medications   Medication Dose Route Frequency Provider Last Rate Last Dose    0.9 % sodium chloride infusion   Intravenous Continuous Perla Sprcarolinals, DO        morphine sulfate (PF) injection 4 mg  4 mg Intravenous Q30 Min PRN Perla Juanals, DO   4 mg at 11/27/20 1929    ondansetron (ZOFRAN) injection 4 mg  4 mg Intravenous Q30 Min PRN Perla Juanals, DO   4 mg at 11/27/20 1923    metronidazole (FLAGYL) 500 mg in NaCl 100 mL IVPB premix  500 mg Intravenous Once Perla Juanals,  mL/hr at 11/27/20 2051 500 mg at 11/27/20 2051    levoFLOXacin (LEVAQUIN) 500 MG/100ML infusion 500 mg  500 mg Intravenous Once Perla Sprawls,  mL/hr at 11/27/20 2051 500 mg at 11/27/20 2051     Current Outpatient Medications   Medication Sig Dispense Refill    Cholecalciferol (VITAMIN D3 PO) Take 1 tablet by mouth daily      ASPIRIN 81 PO Take 81 mg by mouth daily      metoprolol succinate (TOPROL XL) 50 MG extended release tablet Take 0.5 tablets by mouth daily 30 tablet 3    dofetilide (TIKOSYN) 250 MCG capsule Take 1 capsule by mouth every 12 hours 60 capsule 3    Multiple Vitamins-Minerals (MULTIVITAMIN ADULT PO) Take by mouth       Facility-Administered Medications Ordered in Other Encounters   Medication Dose Route Frequency Provider Last Rate Last Dose    midazolam (VERSED) injection 2 mg  2 mg Intravenous Once Tino Lowery MD         Allergies   Allergen Reactions    Codeine Nausea Only    Pcn [Penicillins] Rash    Sulfa Antibiotics Rash       Nursing Notes Reviewed    Physical Exam:  ED Triage Vitals   Enc Vitals Group      BP 11/27/20 1833 126/82      Pulse 11/27/20 1833 99      Resp 11/27/20 1833 18      Temp 11/27/20 1833 99.1 °F (37.3 °C)      Temp Source 11/27/20 1833 Oral      SpO2 11/27/20 1833 98 %      Weight 11/27/20 1829 220 lb (99.8 kg)      Height 11/27/20 1829 5' 10\" (1.778 m)      Head Circumference --       Peak Flow --       Pain Score --       Pain Loc --       Pain Edu? --       Excl. in [address]? --      GENERAL APPEARANCE: Awake and alert. Cooperative. No acute distress. He appears uncomfortable  HEAD: Normocephalic. Atraumatic. EYES: EOM's grossly intact. Sclera anicteric. ENT: Tolerates saliva. No trismus. NECK: Supple. Trachea midline. CARDIO: RRR. Radial pulse 2+. LUNGS: Respirations unlabored. CTAB. ABDOMEN: Slightly distended. He has tenderness in his right lower quadrant and in his left lower quadrant but worse on the right side. He has pain when the bed is shaking. Bowel sounds are decreased in all 4 quadrants. He is showing some guarding and some rebound on exam  EXTREMITIES: No acute deformities. SKIN: Warm and dry. NEUROLOGICAL: No gross facial drooping. Moves all 4 extremities spontaneously. PSYCHIATRIC: Normal mood.      Labs:  Results for orders placed or performed during the hospital encounter of 11/27/20   Urinalysis with microscopic   Result Value Ref Range    Color, UA YELLOW YELLOW    Clarity, UA CLEAR CLEAR    Glucose, Urine NEGATIVE NEGATIVE MG/DL    Bilirubin Urine SMALL (A) NEGATIVE MG/DL    Ketones, Urine TRACE (A) NEGATIVE MG/DL    Specific Gravity, UA 1.026 1.001 - 1.035    Blood, Urine NEGATIVE NEGATIVE    pH, Urine 5.5 5.0 - 8.0    Protein, UA NEGATIVE NEGATIVE MG/DL    Urobilinogen, Urine 1.0 0.2 - 1.0 MG/DL    Nitrite Urine, Quantitative NEGATIVE NEGATIVE    Leukocyte Esterase, Urine NEGATIVE NEGATIVE    Volume, (UVOL) 12 10 - 12 ML    Ictotest NEGATIVE     RBC, UA NO CELLS SEEN 0 - 3 /HPF    WBC, UA 4 TO 6 0 - 2 /HPF    Epithelial Cells, UA 0 TO 1 /HPF    Cast Type NO CAST FORMS SEEN NO CAST FORMS SEEN /HPF    Bacteria, UA FEW (A) NEGATIVE /HPF    Crystal Type NONE SEEN NEGATIVE /HPF    Mucus, UA 1+ (A) NEGATIVE HPF   CBC Auto Differential   Result Value Ref Range    WBC 11.1 (H) 4.0 - 10.5 K/CU MM    RBC 4.84 4.6 - 6.2 M/CU MM    Hemoglobin 14.6 13.5 - 18.0 GM/DL    Hematocrit 42.8 42 - 52 %    MCV 88.4 78 - 100 FL    MCH 30.2 27 - 31 PG    MCHC 34.1 32.0 - 36.0 %    RDW 11.9 11.7 - 14.9 %    Platelets 126 597 - 462 K/CU MM    MPV 9.4 7.5 - 11.1 FL    Differential Type AUTOMATED DIFFERENTIAL     Segs Relative 74.0 (H) 36 - 66 %    Lymphocytes % 16.4 (L) 24 - 44 %    Monocytes % 7.5 (H) 0 - 4 %    Eosinophils % 1.2 0 - 3 %    Basophils % 0.4 0 - 1 %    Segs Absolute 8.2 K/CU MM    Lymphocytes Absolute 1.8 K/CU MM    Monocytes Absolute 0.8 K/CU MM    Eosinophils Absolute 0.1 K/CU MM    Basophils Absolute 0.0 K/CU MM    Immature Neutrophil % 0.5 (H) 0 - 0.43 %    Total Immature Neutrophil 0.06 K/CU MM   Comprehensive Metabolic Panel   Result Value Ref Range    Sodium 139 135 - 145 MMOL/L    Potassium 3.7 3.5 - 5.1 MMOL/L    Chloride 102 99 - 110 mMol/L    CO2 24 21 - 32 MMOL/L    BUN 15 6 - 23 MG/DL    CREATININE 1.0 0.9 - 1.3 MG/DL    Glucose 116 (H) 70 - 99 MG/DL    Calcium 9.5 8.3 - 10.6 MG/DL    Alb 4.4 3.4 - 5.0 GM/DL    Total Protein 7.0 6.4 - 8.2 GM/DL    Total Bilirubin 0.5 0.0 - 1.0 MG/DL    ALT 37 10 - 40 U/L    AST 19 15 - 37 IU/L    Alkaline Phosphatase 72 40 - 129 IU/L    GFR Non-African American >60 >60 mL/min/1.73m2    GFR African American >60 >60 mL/min/1.73m2    Anion Gap 13 4 - 16   Lactic Acid, Plasma   Result Value Ref Range    Lactate 1.7 0.4 - 2.0 mMOL/L   Lipase   Result Value Ref Range    Lipase 15 13 - 60 IU/L   COVID-19    Specimen: Nasopharyngeal Swab   Result Value Ref Range    Source THROAT     SARS-CoV-2, NAAT NOT DETECTED            Radiographs (if obtained):  [] The following radiograph was interpreted by myself in the absence of a radiologist:  [x] Radiologist's Report reviewed at time of ED visit:  CT ABDOMEN PELVIS W IV CONTRAST   Final Result   1. Acute appendicitis. Recommend prompt surgical consultation. 2. Hepatic steatosis. ED Course and MDM:  Patient presents to the emergency department with abdominal pain that started last night. His CT scan shows acute appendicitis. I discussed his care with Dr. Yokasta Bowens who has agreed to admit this patient to [facility] for appendectomy. Is not clear whether that will be performed tonight or tomorrow. The patient is stable. He has received Levaquin and Flagyl here in the emergency department. He has received morphine. He has expressed interest to have his girlfriend drive him to [facility] for direct inpatient admission. He has been counseled not to eat or drink anything on the way. Dr. Yokasta Bowens was informed of this plan and is in agreement. The patient will be discharged from the emergency department for direct admission to [address] [place] [address] for definitive treatment of acute appendicitis. Final Impression:  1. Acute appendicitis with generalized peritonitis, without gangrene or abscess, unspecified whether perforation present      DISPOSITION Discharge - Pending Orders Complete    Patient referred to:  [address] [place] [address]    Today  Go directly to [facility] for inpatient admission.   Do not stop for anything to eat or drink    Discharge medications:  Current Discharge Medication List        (Please note that portions of this note may have been completed with a voice recognition program. Efforts were made to edit the dictations but occasionally words are mis-transcribed.)    Laya Cortés DO, [facility]  Board certified in [address] [place] [address]        Laya Cortés, [address] [place] [address]  11/27/20 0000

## 2020-12-07 NOTE — PROGRESS NOTES
Jarrodantione Hebert is 1 week post-op: lap appy for acute appendicitis. Presenting for routine follow-up. S:  Doing well. Patient has noted no excessive redness and swelling. O:  Wound healing well. A:  Satisfactory course. P: Subcuticular closure intact. Patient to return prn. Can return to work tomorrow  Patient is to return as needed for redness, swelling, discomfort, or any concern about his  surgery.

## 2020-12-08 ENCOUNTER — OFFICE VISIT (OUTPATIENT)
Dept: SURGERY | Age: 50
End: 2020-12-08

## 2020-12-08 VITALS
TEMPERATURE: 96.6 F | BODY MASS INDEX: 32.28 KG/M2 | DIASTOLIC BLOOD PRESSURE: 69 MMHG | RESPIRATION RATE: 18 BRPM | SYSTOLIC BLOOD PRESSURE: 122 MMHG | HEART RATE: 76 BPM | WEIGHT: 225 LBS

## 2020-12-08 PROCEDURE — 99024 POSTOP FOLLOW-UP VISIT: CPT | Performed by: SURGERY

## 2020-12-08 NOTE — LETTER
[facility]  [address]  Phone: [phone]  Fax: [phone]    Sonny Osullivan MD        December 8, 2020     Patient: Gregory Jung   YOB: 1970   Date of Visit: 12/8/2020       To Whom It May Concern: It is my medical opinion that [employer] Francois may return to work on 12/9/2020 with the following restrictions: lifting/carrying not to exceed 20 lbs lbs. .    If you have any questions or concerns, please don't hesitate to call.     Sincerely,        Sonny Osullivan MD

## 2020-12-08 NOTE — LETTER
[facility]  [address] [facility] [address]  Phone: [phone]  Fax: [phone]    Jaya Hernández MD        December 8, 2020     Patient: Joshua Joseph   YOB: 1970   Date of Visit: 12/8/2020       To Whom It May Concern: It is my medical opinion that [employer] . May return to work on 12/9/2020 with the following restrictions: lifting/carrying not to exceed 20 lbs. For 1 month. If you have any questions or concerns, please don't hesitate to call.     Sincerely,        Jaya Hernández MD

## 2021-03-03 ENCOUNTER — OFFICE VISIT (OUTPATIENT)
Dept: CARDIOLOGY CLINIC | Age: 51
End: 2021-03-03
Payer: COMMERCIAL

## 2021-03-03 VITALS
HEART RATE: 78 BPM | WEIGHT: 230 LBS | DIASTOLIC BLOOD PRESSURE: 74 MMHG | BODY MASS INDEX: 32.93 KG/M2 | TEMPERATURE: 97.7 F | HEIGHT: 70 IN | SYSTOLIC BLOOD PRESSURE: 130 MMHG

## 2021-03-03 DIAGNOSIS — Z86.79 S/P ABLATION OF ATRIAL FLUTTER: ICD-10-CM

## 2021-03-03 DIAGNOSIS — Z98.890 S/P ABLATION OF ATRIAL FIBRILLATION: Primary | ICD-10-CM

## 2021-03-03 DIAGNOSIS — I10 ESSENTIAL HYPERTENSION: ICD-10-CM

## 2021-03-03 DIAGNOSIS — Z98.890 S/P ABLATION OF ATRIAL FLUTTER: ICD-10-CM

## 2021-03-03 DIAGNOSIS — Z86.79 S/P ABLATION OF ATRIAL FIBRILLATION: Primary | ICD-10-CM

## 2021-03-03 PROCEDURE — 99213 OFFICE O/P EST LOW 20 MIN: CPT | Performed by: NURSE PRACTITIONER

## 2021-03-03 PROCEDURE — 93000 ELECTROCARDIOGRAM COMPLETE: CPT | Performed by: NURSE PRACTITIONER

## 2021-03-03 NOTE — LETTER
Goldy MARSHALL Stephanie Toneyalejandra  1970  [id number]    Have you had any Chest Pain that is not new? - No    Have you had any Shortness of Breath - No    Have you had any dizziness - No    Have you had any palpitations that are not new?  - No     Is the patient on any of the following medications - Dofetilide (Tikosyn)  If Yes DO EKG - Needs done every 6 months    Do you have any edema - swelling in No      Do you have a surgery or procedure scheduled in the near future - No

## 2021-03-03 NOTE — PROGRESS NOTES
Electrophysiology FU Note      Chief complaint:  Follow up on atrial fibrillation and tikosyn monitoring therapy    Referring physician:        Primary care physician: Jose Montano DO      History of Present Illness: This visit 3/3/2021  Patient is here today for 6-month follow-up on atrial fibrillation and Tikosyn therapy. He reports he has been feeling well. He denies palpitations or tachycardia. He denies chest pain, lightheadedness, dizziness, edema shortness of breath or syncope. He does occasionally drink alcohol. He does not drink caffeine. Previous visit  Yvan Stahl states that he is feeling well. He is denying any shortness of breath or palpitations. He does have occasional alcoholic beverage. He minimizes caffeine intake. Previous visit (2/19/2020)      Chief Complaint   Patient presents with    6 Month Follow-Up     pt here for Follow Up. pt denies Chest Pain, Palpitaions,SOB, Dizziness, Edma. Pt drinks alcohol and caffeine occasionally. Previous visit:(5/23/2019)      Chief Complaint   Patient presents with    Atrial Fibrillation     OV for 3 month check on afib. Denies chest pain, shortness of breath. Denies palpitations  Feels over all okay - some tiredness he reports. Previous visit:(2/6/2019)      Chief Complaint   Patient presents with    Tachycardia     Pt is here for a 3 wk f/u ablation. Pt denies chest pain, shortness of breath, dizziness, palpitations, and edema. Previous visit: (1/9/2018)      Chief Complaint   Patient presents with    Irregular Heart Beat     Gaetano patient here to follow up after cardioversion 12/26. Felt good post cardioversion for few days and then he started back having problems. Pt c/o SOB all the time but not new or worse than before. Pt denies CP, dizziness, palpitations or edema. Patient receiveing Xarelto samples and needs amio refill.     Shortness of Breath           Previous visit (12/12/2018)      Chief Complaint   Patient presents with    Chest Pain      patient sent here from PCP visit this morning. Pt c/o chest pains, radiating into neck, shoulder and hip pain. Sob with activity, palpitations, migraines, dizziness and cold sweats. Episodes have happened at work while standing on his machine. Symptoms are getting worse after experiencing them for the past few months. Pt denies edema.  Shortness of Breath    Palpitations    Dizziness           Previous visit:2/10/2016)      Chief Complaint   Patient presents with    6 Month Follow-Up     Patient is here for 6 month OV, he denies any chest pain, palpitations, dizziness, SOB or edema. Pt wants to discuss some of his medications with you. Over all he feels lot better  He reports arthralgia at times - question of medication induced  Metoprolol makes him tired for some time after he takes it. Previous visit: (7/10/2015)      Chief Complaint   Patient presents with    Check-Up     2 month follow up. Denies dizziness, edema, and palpitations. He hasn't felt like he has been in A-Fib since having the ablation. Had great energies and was able to work with out any problem till he has come down with this URI       Chest Congestion     Patient states that for the last 3 weeks he has been feeling a congestion in his chest. He says it feels like he might be getting pneumonia. When he lays down it feels like there is a weight on his chest. He says he also feels achy in his joints. He was too scared to take anything that might cause him to go back into A-Fib. Sputum - yellowish      Shortness of Breath     Patient states that he feels short of breath with stairs only this started with URI symptoms. Denies chest pain,  palpitations, syncope or presyncope, edema        Previous visit: 2/20/2015      Chief Complaint   Patient presents with    Fatigue     Patient had cardioversion on 01/23/15.  He states he did okay until last 2 days he started feeling tired, he went to PCP who did EKG and told him that he was having flutter. Patient states that he has been having some chest pain and sweating when his heart rate is fast. After taking digoxin he felt better      called me from his clinic. I recommended to start on low dose digoxin as patient did not want to go to hospital for rapid ventricular rate. I arranged an appointment today so that I can assess patient. Patient feels okay now but tired. Has low energy and after work feels very drained. Previous visit:    Chief Complaint   Patient presents with    Fatigue     Patient here for 3 week follow up with EKG. Patient states that he feels tired all of the time. He states that he had labs with PCP on 01/16/15 and again on yesterday. Patient is here to discuss possible cardioversion. Tired all the time    Patient had an LA clot when we performed CARMENZA since then on anticoagulation - 4 weeks    Chest tightness off and on - with palpitations  Shortness of breath Yes with palpitations  Palpitations Yes twice an hour  Syncope or Presyncope No  Edema No  Claudication No        Past Medical History:   Diagnosis Date    Arrhythmia 11/2014    Atrial fib    Arthritis     right shoulder    Atrial fibrillation (HCC)     CAD (coronary artery disease)     H/O cardiovascular stress test 12/12/2014    cardiolite-normal, no ischemia    H/O echocardiogram 4/13/15    EF 50-55% Mildly dilated left atrium. Borderline sized right ventricle.  Trace MR & TR.    History of echocardiogram 03/04/2020    EF 55-60%, Left atrium mildly dilated, no pericardial effusion, no significant valvular disease or diastolic dysfunction       Past Surgical History:   Procedure Laterality Date    ACHILLES TENDON SURGERY  2005    repair torn Jcarlos's tendon    BACK SURGERY      CARDIOVERSION  11/17/14    HERNIA REPAIR      LAPAROSCOPIC APPENDECTOMY N/A 11/28/2020    APPENDECTOMY LAPAROSCOPIC performed by Vita Taylor MD at [facility] 6  3/10/15    cardiac ablation for a fib/ a flutter    SHOULDER SURGERY  2010    right shoulder rotator cuff surgery       Family History   Problem Relation Age of Onset    Cancer Father     Diabetes Father         reports that he has never smoked. His smokeless tobacco use includes chew. He reports current alcohol use. He reports that he does not use drugs. Allergies   Allergen Reactions    Codeine Nausea Only    Pcn [Penicillins] Rash    Sulfa Antibiotics Rash       Current Outpatient Medications   Medication Sig Dispense Refill    Cholecalciferol (VITAMIN D3 PO) Take 1 tablet by mouth daily      ASPIRIN 81 PO Take 81 mg by mouth daily      metoprolol succinate (TOPROL XL) 50 MG extended release tablet Take 0.5 tablets by mouth daily 30 tablet 3    dofetilide (TIKOSYN) 250 MCG capsule Take 1 capsule by mouth every 12 hours 60 capsule 3    Multiple Vitamins-Minerals (MULTIVITAMIN ADULT PO) Take by mouth       No current facility-administered medications for this visit. Facility-Administered Medications Ordered in Other Visits   Medication Dose Route Frequency Provider Last Rate Last Admin    midazolam (VERSED) injection 2 mg  2 mg Intravenous Once Toni Quiñonez MD           Review of Systems:   Review of Systems   Constitutional:   Negative for fever, chills and activity change. HENT: Negative for congestion, ear pain and tinnitus. Eyes: Negative for photophobia, pain and visual disturbance. Respiratory:  Negative for shortness of breath Negative for cough, chest tightness and wheezing. Cardiovascular:  Negative chest pain and palpitations Negative for leg swelling. Gastrointestinal: Negative for nausea, vomiting, abdominal pain, diarrhea, constipation and blood in stool. Endocrine: Negative for cold intolerance and heat intolerance. Genitourinary: Negative for dysuria, hematuria and flank pain. Musculoskeletal: Negative for myalgias, and neck stiffness. Arthralgias  Skin: Negative for color change and rash. Allergic/Immunologic: Negative for food allergies. Neurological:  Negative for dizziness, light-headedness, numbness and headaches. Hematological: Does not bruise/bleed easily. Psychiatric/Behavioral: Negative for behavioral problems, confusion and agitation. Physical Examination:    /74 (Site: Left Upper Arm, Position: Sitting, Cuff Size: Large Adult)   Pulse 78   Temp 97.7 °F (36.5 °C) (Temporal)   Ht 5' 10\" (1.778 m)   Wt 230 lb (104.3 kg)   BMI 33.00 kg/m²    Wt Readings from Last 3 Encounters:   03/03/21 230 lb (104.3 kg)   12/08/20 225 lb (102.1 kg)   11/27/20 220 lb (99.8 kg)     Body mass index is 33 kg/m². Physical Exam   Constitutional: He is oriented to person, place, and time and well-developed, and in no distress. HENT:   Head: Normocephalic and atraumatic. Eyes: Conjunctivae  are normal. Pupils are equal, round, and reactive to light. Right and left eye exhibits no discharge. Neck: Normal range of motion. No JVD present. Cardiovascular: Regular rhythm and rate- no friction rub. No murmur heard. No murmur appreciated   Pulmonary/Chest: Effort normal and breath sounds normal. No stridor. No respiratory distress. He has no wheezes. Abdominal: Soft. Bowel sounds are normal. He exhibits no distension. There is no tenderness. Musculoskeletal: Normal range of motion. He exhibits no edema or tenderness. Neurological: He is alert and oriented to person, place, and time. He displays normal reflexes. No cranial nerve deficit. Coordination normal.   Skin: Skin is warm and dry. No rash noted. No erythema.    Psychiatric: Mood and affect normal.           CBC:   Lab Results   Component Value Date    WBC 11.1 11/27/2020    HGB 14.6 11/27/2020    HCT 42.8 11/27/2020     11/27/2020     Lipids:   Lab Results   Component Value Date    CHOL 151 07/12/2018     PT/INR:   No components found for: PT,  INR     BMP:    Lab Results   Component Value Date     11/27/2020    K 3.7 11/27/2020     11/27/2020    CO2 24 11/27/2020    BUN 15 11/27/2020     CMP:   Lab Results   Component Value Date    AST 19 11/27/2020    PROT 7.0 11/27/2020    BILITOT 0.5 11/27/2020    ALKPHOS 72 11/27/2020     TSH:    No results found for: TSH    EKGINTERPRETATION - EKG Interpretation: SR     IMPRESSION / RECOMMENDATIONS:     Paroxysmal atrial tachycardia  History of atrial fibrillation ablation  History of atrial flutter ablation  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 No palpitations or tachycardia  EKG obtained noted to be in sinus rhythm.     Continue Tikosyn    HF/LV dysfunction No (0)   HTN Yes (1)  Age greater /equal 75 No (0)   DM No (0)   Stroke/TIA/TE No (0)   Vascular Disease No (0)   Age 74-69 No (0)   Sex Male  (0)     Chads score of 1  Continue ASA 81 mg daily    BP is stable- continue toprol xl 50 mg daily    Patient to follow up in 6 months     MATTIE Maciel - CNP

## 2021-06-01 ENCOUNTER — TELEPHONE (OUTPATIENT)
Dept: CARDIOLOGY CLINIC | Age: 51
End: 2021-06-01

## 2021-06-01 NOTE — TELEPHONE ENCOUNTER
Left message for patient advising him of his appointment. Patient thought appointment was June 8 and it's September 8 @ 330 with Eryn ZEPEDA. Also, per Dr Agustin Hunter he can get COVID vaccine. If any other concerns or questions asked to call the office.

## 2021-09-08 ENCOUNTER — OFFICE VISIT (OUTPATIENT)
Dept: CARDIOLOGY CLINIC | Age: 51
End: 2021-09-08
Payer: COMMERCIAL

## 2021-09-08 VITALS
HEART RATE: 80 BPM | BODY MASS INDEX: 32.33 KG/M2 | HEIGHT: 70 IN | DIASTOLIC BLOOD PRESSURE: 80 MMHG | WEIGHT: 225.8 LBS | SYSTOLIC BLOOD PRESSURE: 136 MMHG

## 2021-09-08 DIAGNOSIS — I10 ESSENTIAL HYPERTENSION: ICD-10-CM

## 2021-09-08 DIAGNOSIS — Z86.79 S/P ABLATION OF ATRIAL FLUTTER: ICD-10-CM

## 2021-09-08 DIAGNOSIS — Z79.899 VISIT FOR MONITORING TIKOSYN THERAPY: ICD-10-CM

## 2021-09-08 DIAGNOSIS — Z98.890 S/P ABLATION OF ATRIAL FLUTTER: ICD-10-CM

## 2021-09-08 DIAGNOSIS — Z98.890 S/P ABLATION OF ATRIAL FIBRILLATION: Primary | ICD-10-CM

## 2021-09-08 DIAGNOSIS — I47.1 ATRIAL TACHYCARDIA, PAROXYSMAL (HCC): ICD-10-CM

## 2021-09-08 DIAGNOSIS — Z51.81 VISIT FOR MONITORING TIKOSYN THERAPY: ICD-10-CM

## 2021-09-08 DIAGNOSIS — Z86.79 S/P ABLATION OF ATRIAL FIBRILLATION: Primary | ICD-10-CM

## 2021-09-08 PROBLEM — I47.19 ATRIAL TACHYCARDIA, PAROXYSMAL: Status: ACTIVE | Noted: 2021-09-08

## 2021-09-08 PROCEDURE — 99214 OFFICE O/P EST MOD 30 MIN: CPT | Performed by: NURSE PRACTITIONER

## 2021-09-08 PROCEDURE — 93000 ELECTROCARDIOGRAM COMPLETE: CPT | Performed by: NURSE PRACTITIONER

## 2021-09-08 RX ORDER — IBUPROFEN 800 MG/1
TABLET ORAL
COMMUNITY
Start: 2021-08-06 | End: 2022-07-28 | Stop reason: SDUPTHER

## 2021-09-08 NOTE — PROGRESS NOTES
Electrophysiology FU Note      Chief complaint:  Follow up on atrial fibrillation and tikosyn monitoring therapy    Referring physician:        Primary care physician: Julio César Rapp DO      History of Present Illness: This visit 9/8/2021  Patient is here today for 6-month follow-up on atrial fibrillation, atrial tachycardia and Tikosyn monitoring therapy. He reports that since last office visit he has been doing well. He denies palpitations or tachycardia. He denies chest pain, lightheadedness, dizziness, edema or syncope. He does not drink caffeine however still occasionally drinking alcohol. Previous visit 3/3/2021  Patient is here today for 6-month follow-up on atrial fibrillation and Tikosyn therapy. He reports he has been feeling well. He denies palpitations or tachycardia. He denies chest pain, lightheadedness, dizziness, edema shortness of breath or syncope. He does occasionally drink alcohol. He does not drink caffeine. Previous visit  Jonathan Alexis states that he is feeling well. He is denying any shortness of breath or palpitations. He does have occasional alcoholic beverage. He minimizes caffeine intake. Previous visit (2/19/2020)      Chief Complaint   Patient presents with    6 Month Follow-Up     pt here for Follow Up. pt denies Chest Pain, Palpitaions,SOB, Dizziness, Edma. Pt drinks alcohol and caffeine occasionally. Previous visit:(5/23/2019)      Chief Complaint   Patient presents with    Atrial Fibrillation     OV for 3 month check on afib. Denies chest pain, shortness of breath. Denies palpitations  Feels over all okay - some tiredness he reports. Previous visit:(2/6/2019)      Chief Complaint   Patient presents with    Tachycardia     Pt is here for a 3 wk f/u ablation. Pt denies chest pain, shortness of breath, dizziness, palpitations, and edema.            Previous visit: (1/9/2018)      Chief Complaint   Patient presents with    Irregular Heart Beat     Gaetano patient here to follow up after cardioversion 12/26. Felt good post cardioversion for few days and then he started back having problems. Pt c/o SOB all the time but not new or worse than before. Pt denies CP, dizziness, palpitations or edema. Patient receiveing Xarelto samples and needs amio refill.  Shortness of Breath           Previous visit (12/12/2018)      Chief Complaint   Patient presents with    Chest Pain      patient sent here from PCP visit this morning. Pt c/o chest pains, radiating into neck, shoulder and hip pain. Sob with activity, palpitations, migraines, dizziness and cold sweats. Episodes have happened at work while standing on his machine. Symptoms are getting worse after experiencing them for the past few months. Pt denies edema.  Shortness of Breath    Palpitations    Dizziness           Previous visit:2/10/2016)      Chief Complaint   Patient presents with    6 Month Follow-Up     Patient is here for 6 month OV, he denies any chest pain, palpitations, dizziness, SOB or edema. Pt wants to discuss some of his medications with you. Over all he feels lot better  He reports arthralgia at times - question of medication induced  Metoprolol makes him tired for some time after he takes it. Previous visit: (7/10/2015)      Chief Complaint   Patient presents with    Check-Up     2 month follow up. Denies dizziness, edema, and palpitations. He hasn't felt like he has been in A-Fib since having the ablation. Had great energies and was able to work with out any problem till he has come down with this URI       Chest Congestion     Patient states that for the last 3 weeks he has been feeling a congestion in his chest. He says it feels like he might be getting pneumonia. When he lays down it feels like there is a weight on his chest. He says he also feels achy in his joints.  He was too scared to take anything that might cause him to go back into A-Fib. Sputum - yellowish      Shortness of Breath     Patient states that he feels short of breath with stairs only this started with URI symptoms. Denies chest pain,  palpitations, syncope or presyncope, edema        Previous visit: 2/20/2015      Chief Complaint   Patient presents with    Fatigue     Patient had cardioversion on 01/23/15. He states he did okay until last 2 days he started feeling tired, he went to PCP who did EKG and told him that he was having flutter. Patient states that he has been having some chest pain and sweating when his heart rate is fast. After taking digoxin he felt better      called me from his clinic. I recommended to start on low dose digoxin as patient did not want to go to hospital for rapid ventricular rate. I arranged an appointment today so that I can assess patient. Patient feels okay now but tired. Has low energy and after work feels very drained. Previous visit:    Chief Complaint   Patient presents with    Fatigue     Patient here for 3 week follow up with EKG. Patient states that he feels tired all of the time. He states that he had labs with PCP on 01/16/15 and again on yesterday. Patient is here to discuss possible cardioversion. Tired all the time    Patient had an LA clot when we performed CARMENZA since then on anticoagulation - 4 weeks    Chest tightness off and on - with palpitations  Shortness of breath Yes with palpitations  Palpitations Yes twice an hour  Syncope or Presyncope No  Edema No  Claudication No        Past Medical History:   Diagnosis Date    Arrhythmia 11/2014    Atrial fib    Arthritis     right shoulder    Atrial fibrillation (HCC)     CAD (coronary artery disease)     H/O cardiovascular stress test 12/12/2014    cardiolite-normal, no ischemia    H/O echocardiogram 4/13/15    EF 50-55% Mildly dilated left atrium. Borderline sized right ventricle.  Trace MR & TR.    History of echocardiogram 03/04/2020    EF 55-60%, Left atrium mildly dilated, no pericardial effusion, no significant valvular disease or diastolic dysfunction       Past Surgical History:   Procedure Laterality Date    ACHILLES TENDON SURGERY  2005    repair torn [place]'s tendon    BACK SURGERY      CARDIOVERSION  11/17/14    HERNIA REPAIR      LAPAROSCOPIC APPENDECTOMY N/A 11/28/2020    APPENDECTOMY LAPAROSCOPIC performed by Gabe Brown MD at [address] [facility]  3/10/15    cardiac ablation for a fib/ a flutter    SHOULDER SURGERY  2010    right shoulder rotator cuff surgery       Family History   Problem Relation Age of Onset    Cancer Father     Diabetes Father         reports that he has never smoked. His smokeless tobacco use includes chew. He reports current alcohol use. He reports that he does not use drugs. Allergies   Allergen Reactions    Codeine Nausea Only    Pcn [Penicillins] Rash    Sulfa Antibiotics Rash       Current Outpatient Medications   Medication Sig Dispense Refill    ibuprofen (ADVIL;MOTRIN) 800 MG tablet       Cholecalciferol (VITAMIN D3 PO) Take 1 tablet by mouth daily      ASPIRIN 81 PO Take 81 mg by mouth daily      metoprolol succinate (TOPROL XL) 50 MG extended release tablet Take 0.5 tablets by mouth daily 30 tablet 3    dofetilide (TIKOSYN) 250 MCG capsule Take 1 capsule by mouth every 12 hours 60 capsule 3    Multiple Vitamins-Minerals (MULTIVITAMIN ADULT PO) Take by mouth       No current facility-administered medications for this visit. Facility-Administered Medications Ordered in Other Visits   Medication Dose Route Frequency Provider Last Rate Last Admin    midazolam (VERSED) injection 2 mg  2 mg IntraVENous Once Osvaldo Jimenez MD           Review of Systems:   Review of Systems   Constitutional:   Negative for fever, chills and activity change. HENT: Negative for congestion, ear pain and tinnitus. Eyes: Negative for photophobia, pain and visual disturbance. Respiratory:  Negative for shortness of breath Negative for cough, chest tightness and wheezing. Cardiovascular:  Negative chest pain and palpitations Negative for leg swelling. Gastrointestinal: Negative for nausea, vomiting, abdominal pain, diarrhea, constipation and blood in stool. Endocrine: Negative for cold intolerance and heat intolerance. Genitourinary: Negative for dysuria, hematuria and flank pain. Musculoskeletal: Negative for myalgias, and neck stiffness. Arthralgias  Skin: Negative for color change and rash. Allergic/Immunologic: Negative for food allergies. Neurological:  Negative for dizziness, light-headedness, numbness and headaches. Hematological: Does not bruise/bleed easily. Psychiatric/Behavioral: Negative for behavioral problems, confusion and agitation. Physical Examination:    /80   Pulse 80   Ht 5' 10\" (1.778 m)   Wt 225 lb 12.8 oz (102.4 kg)   BMI 32.40 kg/m²    Wt Readings from Last 3 Encounters:   09/08/21 225 lb 12.8 oz (102.4 kg)   03/03/21 230 lb (104.3 kg)   12/08/20 225 lb (102.1 kg)     Body mass index is 32.4 kg/m². Physical Exam   Constitutional: He is oriented to person, place, and time and well-developed, and in no distress. HENT:   Head: Normocephalic and atraumatic. Eyes: Conjunctivae  are normal. Pupils are equal, round, and reactive to light. Right and left eye exhibits no discharge. Neck: Normal range of motion. No JVD present. Cardiovascular: Regular rhythm and rate- no friction rub. No murmur heard. No murmur appreciated   Pulmonary/Chest: Effort normal and breath sounds normal. No stridor. No respiratory distress. He has no wheezes. Abdominal: Soft. Bowel sounds are normal. He exhibits no distension. There is no tenderness. Musculoskeletal: Normal range of motion. He exhibits no edema or tenderness. Neurological: He is alert and oriented to person, place, and time. He displays normal reflexes.  No cranial nerve deficit. Coordination normal.   Skin: Skin is warm and dry. No rash noted. No erythema. Psychiatric: Mood and affect normal.           CBC:   Lab Results   Component Value Date    WBC 11.1 11/27/2020    HGB 14.6 11/27/2020    HCT 42.8 11/27/2020     11/27/2020     Lipids:   Lab Results   Component Value Date    CHOL 151 07/12/2018     PT/INR:   No components found for: PT,  INR     BMP:    Lab Results   Component Value Date     11/27/2020    K 3.7 11/27/2020     11/27/2020    CO2 24 11/27/2020    BUN 15 11/27/2020     CMP:   Lab Results   Component Value Date    AST 19 11/27/2020    PROT 7.0 11/27/2020    BILITOT 0.5 11/27/2020    ALKPHOS 72 11/27/2020     TSH:    No results found for: TSH    EKGINTERPRETATION - EKG Interpretation: Sinus rhythm    IMPRESSION / RECOMMENDATIONS:     Paroxysmal atrial tachycardia  History of atrial fibrillation ablation  History of atrial flutter ablation  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     QTC is within range to continue Tikosyn 250 mcg twice daily  Patient has had BMP in the last year and creatinine was 1.0  Recommend yearly labs as patient is on Tikosyn    HF/LV dysfunction No (0)   HTN Yes (1)  Age greater /equal 75 No (0)   DM No (0)   Stroke/TIA/TE No (0)   Vascular Disease No (0)   Age 74-69 No (0)   Sex Male  (0)     Chads score of 1  Continue ASA 81 mg daily    Vitals:    09/08/21 1527   BP: 136/80   Pulse: 80       BP is stable- continue toprol xl 50 mg daily    Patient to follow up in 6 months     MATTIE Cobian - KATELYN

## 2022-03-16 ENCOUNTER — OFFICE VISIT (OUTPATIENT)
Dept: CARDIOLOGY CLINIC | Age: 52
End: 2022-03-16
Payer: COMMERCIAL

## 2022-03-16 VITALS
BODY MASS INDEX: 32.24 KG/M2 | OXYGEN SATURATION: 98 % | HEART RATE: 72 BPM | WEIGHT: 225.2 LBS | SYSTOLIC BLOOD PRESSURE: 124 MMHG | HEIGHT: 70 IN | DIASTOLIC BLOOD PRESSURE: 74 MMHG

## 2022-03-16 DIAGNOSIS — Z86.79 S/P ABLATION OF ATRIAL FIBRILLATION: Primary | ICD-10-CM

## 2022-03-16 DIAGNOSIS — I47.1 ATRIAL TACHYCARDIA, PAROXYSMAL (HCC): ICD-10-CM

## 2022-03-16 DIAGNOSIS — Z98.890 S/P ABLATION OF ATRIAL FIBRILLATION: Primary | ICD-10-CM

## 2022-03-16 DIAGNOSIS — Z86.79 S/P ABLATION OF ATRIAL FLUTTER: ICD-10-CM

## 2022-03-16 DIAGNOSIS — I10 ESSENTIAL HYPERTENSION: ICD-10-CM

## 2022-03-16 DIAGNOSIS — Z98.890 S/P ABLATION OF ATRIAL FLUTTER: ICD-10-CM

## 2022-03-16 PROCEDURE — 99214 OFFICE O/P EST MOD 30 MIN: CPT | Performed by: NURSE PRACTITIONER

## 2022-03-16 PROCEDURE — 93000 ELECTROCARDIOGRAM COMPLETE: CPT | Performed by: NURSE PRACTITIONER

## 2022-03-16 RX ORDER — DOFETILIDE 0.25 MG/1
250 CAPSULE ORAL EVERY 12 HOURS SCHEDULED
Qty: 60 CAPSULE | Refills: 3 | Status: SHIPPED | OUTPATIENT
Start: 2022-03-16 | End: 2022-07-28 | Stop reason: SDUPTHER

## 2022-03-16 RX ORDER — METOPROLOL SUCCINATE 50 MG/1
25 TABLET, EXTENDED RELEASE ORAL DAILY
Qty: 30 TABLET | Refills: 3 | Status: SHIPPED | OUTPATIENT
Start: 2022-03-16 | End: 2022-07-28 | Stop reason: SDUPTHER

## 2022-03-16 NOTE — PROGRESS NOTES
Electrophysiology FU Note      Chief complaint:  Follow up on atrial fibrillation and tikosyn monitoring therapy    Referring physician:        Primary care physician: Shailesh Coyle DO      History of Present Illness: This visit 3/16/2022  Patient is here today for 6-month follow-up on atrial fibrillation, atrial tachycardia and Tikosyn monitoring therapy. Patient continues to do well on Tikosyn. He denies chest pain, palpitations, shortness of breath, lightheadedness, dizziness, edema or syncope. Previous visit 9/8/2021  Patient is here today for 6-month follow-up on atrial fibrillation, atrial tachycardia and Tikosyn monitoring therapy. He reports that since last office visit he has been doing well. He denies palpitations or tachycardia. He denies chest pain, lightheadedness, dizziness, edema or syncope. He does not drink caffeine however still occasionally drinking alcohol. Previous visit 3/3/2021  Patient is here today for 6-month follow-up on atrial fibrillation and Tikosyn therapy. He reports he has been feeling well. He denies palpitations or tachycardia. He denies chest pain, lightheadedness, dizziness, edema shortness of breath or syncope. He does occasionally drink alcohol. He does not drink caffeine. Previous visit  Allan Gordon states that he is feeling well. He is denying any shortness of breath or palpitations. He does have occasional alcoholic beverage. He minimizes caffeine intake. Previous visit (2/19/2020)      Chief Complaint   Patient presents with    6 Month Follow-Up     pt here for Follow Up. pt denies Chest Pain, Palpitaions,SOB, Dizziness, Edma. Pt drinks alcohol and caffeine occasionally. Previous visit:(5/23/2019)      Chief Complaint   Patient presents with    Atrial Fibrillation     OV for 3 month check on afib. Denies chest pain, shortness of breath. Denies palpitations  Feels over all okay - some tiredness he reports. Previous visit:(2/6/2019)      Chief Complaint   Patient presents with    Tachycardia     Pt is here for a 3 wk f/u ablation. Pt denies chest pain, shortness of breath, dizziness, palpitations, and edema. Previous visit: (1/9/2018)      Chief Complaint   Patient presents with    Irregular Heart Beat     Gaetano patient here to follow up after cardioversion 12/26. Felt good post cardioversion for few days and then he started back having problems. Pt c/o SOB all the time but not new or worse than before. Pt denies CP, dizziness, palpitations or edema. Patient receiveing Xarelto samples and needs amio refill.  Shortness of Breath           Previous visit (12/12/2018)      Chief Complaint   Patient presents with    Chest Pain      patient sent here from PCP visit this morning. Pt c/o chest pains, radiating into neck, shoulder and hip pain. Sob with activity, palpitations, migraines, dizziness and cold sweats. Episodes have happened at work while standing on his machine. Symptoms are getting worse after experiencing them for the past few months. Pt denies edema.  Shortness of Breath    Palpitations    Dizziness           Previous visit:2/10/2016)      Chief Complaint   Patient presents with    6 Month Follow-Up     Patient is here for 6 month OV, he denies any chest pain, palpitations, dizziness, SOB or edema. Pt wants to discuss some of his medications with you. Over all he feels lot better  He reports arthralgia at times - question of medication induced  Metoprolol makes him tired for some time after he takes it. Previous visit: (7/10/2015)      Chief Complaint   Patient presents with    Check-Up     2 month follow up. Denies dizziness, edema, and palpitations. He hasn't felt like he has been in A-Fib since having the ablation.  Had great energies and was able to work with out any problem till he has come down with this URI       Chest Congestion Patient states that for the last 3 weeks he has been feeling a congestion in his chest. He says it feels like he might be getting pneumonia. When he lays down it feels like there is a weight on his chest. He says he also feels achy in his joints. He was too scared to take anything that might cause him to go back into A-Fib. Sputum - yellowish      Shortness of Breath     Patient states that he feels short of breath with stairs only this started with URI symptoms. Denies chest pain,  palpitations, syncope or presyncope, edema        Previous visit: 2/20/2015      Chief Complaint   Patient presents with    Fatigue     Patient had cardioversion on 01/23/15. He states he did okay until last 2 days he started feeling tired, he went to PCP who did EKG and told him that he was having flutter. Patient states that he has been having some chest pain and sweating when his heart rate is fast. After taking digoxin he felt better      called me from his clinic. I recommended to start on low dose digoxin as patient did not want to go to hospital for rapid ventricular rate. I arranged an appointment today so that I can assess patient. Patient feels okay now but tired. Has low energy and after work feels very drained. Previous visit:    Chief Complaint   Patient presents with    Fatigue     Patient here for 3 week follow up with EKG. Patient states that he feels tired all of the time. He states that he had labs with PCP on 01/16/15 and again on yesterday. Patient is here to discuss possible cardioversion.      Tired all the time    Patient had an LA clot when we performed CARMENZA since then on anticoagulation - 4 weeks    Chest tightness off and on - with palpitations  Shortness of breath Yes with palpitations  Palpitations Yes twice an hour  Syncope or Presyncope No  Edema No  Claudication No        Past Medical History:   Diagnosis Date    Arrhythmia 11/2014    Atrial fib    Arthritis     right shoulder    Atrial fibrillation ([facility] [address]     CAD (coronary artery disease)     H/O cardiovascular stress test 12/12/2014    cardiolite-normal, no ischemia    H/O echocardiogram 4/13/15    EF 50-55% Mildly dilated left atrium. Borderline sized right ventricle. Trace MR & TR.    History of echocardiogram 03/04/2020    EF 55-60%, Left atrium mildly dilated, no pericardial effusion, no significant valvular disease or diastolic dysfunction       Past Surgical History:   Procedure Laterality Date    ACHILLES TENDON SURGERY  2005    repair torn Satin's tendon    BACK SURGERY      CARDIOVERSION  11/17/14    HERNIA REPAIR      LAPAROSCOPIC APPENDECTOMY N/A 11/28/2020    APPENDECTOMY LAPAROSCOPIC performed by Shirley Watson MD at [place] [address]  3/10/15    cardiac ablation for a fib/ a flutter    SHOULDER SURGERY  2010    right shoulder rotator cuff surgery       Family History   Problem Relation Age of Onset    Cancer Father     Diabetes Father         reports that he has never smoked. His smokeless tobacco use includes chew. He reports current alcohol use. He reports that he does not use drugs. Allergies   Allergen Reactions    Codeine Nausea Only    Pcn [Penicillins] Rash    Sulfa Antibiotics Rash       Current Outpatient Medications   Medication Sig Dispense Refill    Lactobacillus (PROBIOTIC ACIDOPHILUS PO) Take by mouth      metoprolol succinate (TOPROL XL) 50 MG extended release tablet Take 0.5 tablets by mouth daily 30 tablet 3    dofetilide (TIKOSYN) 250 MCG capsule Take 1 capsule by mouth every 12 hours 60 capsule 3    ibuprofen (ADVIL;MOTRIN) 800 MG tablet       Cholecalciferol (VITAMIN D3 PO) Take 1 tablet by mouth daily      ASPIRIN 81 PO Take 81 mg by mouth daily      Multiple Vitamins-Minerals (MULTIVITAMIN ADULT PO) Take by mouth       No current facility-administered medications for this visit.      Facility-Administered Medications Ordered in Other Visits   Medication Dose Route Frequency Provider Last Rate Last Admin    midazolam (VERSED) injection 2 mg  2 mg IntraVENous Once Davy Wang MD           Review of Systems:   Review of Systems   Constitutional:   Negative for fever, chills and activity change. HENT: Negative for congestion, ear pain and tinnitus. Eyes: Negative for photophobia, pain and visual disturbance. Respiratory:  Negative for shortness of breath Negative for cough, chest tightness and wheezing. Cardiovascular:  Negative chest pain and palpitations Negative for leg swelling. Gastrointestinal: Negative for nausea, vomiting, abdominal pain, diarrhea, constipation and blood in stool. Endocrine: Negative for cold intolerance and heat intolerance. Genitourinary: Negative for dysuria, hematuria and flank pain. Musculoskeletal: Negative for myalgias, and neck stiffness. Arthralgias  Skin: Negative for color change and rash. Allergic/Immunologic: Negative for food allergies. Neurological:  Negative for dizziness, light-headedness, numbness and headaches. Hematological: Does not bruise/bleed easily. Psychiatric/Behavioral: Negative for behavioral problems, confusion and agitation. Physical Examination:    /74 (Site: Left Upper Arm, Position: Sitting, Cuff Size: Medium Adult)   Pulse 72   Ht 5' 10\" (1.778 m)   Wt 225 lb 3.2 oz (102.2 kg)   SpO2 98%   BMI 32.31 kg/m²    Wt Readings from Last 3 Encounters:   03/16/22 225 lb 3.2 oz (102.2 kg)   09/08/21 225 lb 12.8 oz (102.4 kg)   03/03/21 230 lb (104.3 kg)     Body mass index is 32.31 kg/m². Physical Exam   Constitutional: He is oriented to person, place, and time and well-developed, and in no distress. HENT:   Head: Normocephalic and atraumatic. Eyes: Conjunctivae  are normal. Pupils are equal, round, and reactive to light. Right and left eye exhibits no discharge. Neck: Normal range of motion. No JVD present.   Cardiovascular: Regular rhythm and rate- no friction rub. No murmur heard. No murmur appreciated   Pulmonary/Chest: Effort normal and breath sounds normal. No stridor. No respiratory distress. He has no wheezes. Abdominal: Soft. Bowel sounds are normal. He exhibits no distension. There is no tenderness. Musculoskeletal: Normal range of motion. He exhibits no edema or tenderness. Neurological: He is alert and oriented to person, place, and time. He displays normal reflexes. No cranial nerve deficit. Coordination normal.   Skin: Skin is warm and dry. No rash noted. No erythema. Psychiatric: Mood and affect normal.           CBC:   Lab Results   Component Value Date    WBC 11.1 11/27/2020    HGB 14.6 11/27/2020    HCT 42.8 11/27/2020     11/27/2020     Lipids:   Lab Results   Component Value Date    CHOL 151 07/12/2018     PT/INR:   No components found for: PT,  INR     BMP:    Lab Results   Component Value Date     11/27/2020    K 3.7 11/27/2020     11/27/2020    CO2 24 11/27/2020    BUN 15 11/27/2020     CMP:   Lab Results   Component Value Date    AST 19 11/27/2020    PROT 7.0 11/27/2020    BILITOT 0.5 11/27/2020    ALKPHOS 72 11/27/2020     TSH:    No results found for: TSH    EKGINTERPRETATION - EKG Interpretation: Sinus rhythm    IMPRESSION / RECOMMENDATIONS:     Paroxysmal atrial tachycardia  History of atrial fibrillation ablation  History of atrial flutter ablation  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 No atrial tachycardia, no atrial fibrillation, no atrial flutter on EKG    QTC is within range to continue Tikosyn 250 mcg twice daily  Patient follows with Dr. Carey Valadez and labs are drawn yearly  I did review BMP from Dr. Meka Mijares office.   Labs are within normal limits    HF/LV dysfunction No (0)   HTN Yes (1)  Age greater /equal 75 No (0)   DM No (0)   Stroke/TIA/TE No (0)   Vascular Disease No (0) Age 74-69 No (0)   Sex Male  (0)     Chads score of 1  Continue ASA 81 mg daily    Vitals:    03/16/22 1629   BP: 124/74   Pulse: 72   SpO2: 98%       BP is stable- continue toprol xl 50 mg daily    Patient to follow up in 6 months     Suyapa Carter, APRN - CNP

## 2022-07-28 DIAGNOSIS — G44.221 CHRONIC TENSION-TYPE HEADACHE, INTRACTABLE: ICD-10-CM

## 2022-07-28 DIAGNOSIS — I47.1 ATRIAL TACHYCARDIA, PAROXYSMAL (HCC): ICD-10-CM

## 2022-07-28 DIAGNOSIS — Z98.890 S/P ABLATION OF ATRIAL FIBRILLATION: Primary | ICD-10-CM

## 2022-07-28 DIAGNOSIS — Z86.79 S/P ABLATION OF ATRIAL FIBRILLATION: Primary | ICD-10-CM

## 2022-07-28 RX ORDER — DOFETILIDE 0.25 MG/1
250 CAPSULE ORAL EVERY 12 HOURS SCHEDULED
Qty: 60 CAPSULE | Refills: 3 | Status: SHIPPED | OUTPATIENT
Start: 2022-07-28

## 2022-07-28 RX ORDER — METOPROLOL SUCCINATE 25 MG/1
25 TABLET, EXTENDED RELEASE ORAL DAILY
Qty: 30 TABLET | Refills: 3 | Status: SHIPPED | OUTPATIENT
Start: 2022-07-28

## 2022-07-28 RX ORDER — IBUPROFEN 800 MG/1
800 TABLET ORAL EVERY 8 HOURS PRN
Qty: 90 TABLET | Refills: 1 | Status: SHIPPED | OUTPATIENT
Start: 2022-07-28

## 2022-07-28 NOTE — TELEPHONE ENCOUNTER
Patient called and asked for a refill and wants it sent to [facility] on [id number] [facility]. However, he left a message on my VM and he was cutting out so I did not hear the medication. I did call him back and left a message asking him to return my call.

## 2022-09-14 ENCOUNTER — OFFICE VISIT (OUTPATIENT)
Dept: CARDIOLOGY CLINIC | Age: 52
End: 2022-09-14
Payer: COMMERCIAL

## 2022-09-14 VITALS
HEIGHT: 70 IN | BODY MASS INDEX: 33.47 KG/M2 | OXYGEN SATURATION: 98 % | WEIGHT: 233.8 LBS | DIASTOLIC BLOOD PRESSURE: 72 MMHG | SYSTOLIC BLOOD PRESSURE: 118 MMHG | HEART RATE: 77 BPM

## 2022-09-14 DIAGNOSIS — I48.91 ATRIAL FIBRILLATION WITH RAPID VENTRICULAR RESPONSE (HCC): ICD-10-CM

## 2022-09-14 DIAGNOSIS — I48.0 PAF (PAROXYSMAL ATRIAL FIBRILLATION) (HCC): Primary | ICD-10-CM

## 2022-09-14 PROCEDURE — 99213 OFFICE O/P EST LOW 20 MIN: CPT | Performed by: INTERNAL MEDICINE

## 2022-09-14 PROCEDURE — 93000 ELECTROCARDIOGRAM COMPLETE: CPT | Performed by: INTERNAL MEDICINE

## 2022-09-14 NOTE — PROGRESS NOTES
Electrophysiology FU Note      Chief complaint :  Follow up for atrial fibrillation    Referring physician:        Primary care physician: Lia Copeland DO      History of Present Illness: This visit (9/14/2022)      Chief Complaint   Patient presents with    Follow-up     Patient is here for a 6 month follow up. Patient states he is not having any cardaic issues at this time. Previous visit: (2/19/2020)      Chief Complaint   Patient presents with    6 Month Follow-Up     pt here for Follow Up. pt denies Chest Pain, Palpitaions,SOB, Dizziness, Edma. Pt drinks alcohol and caffeine occasionally. Previous visit:(5/23/2019)      Chief Complaint   Patient presents with    Atrial Fibrillation     OV for 3 month check on afib. Denies chest pain, shortness of breath. Denies palpitations  Feels over all okay - some tiredness he reports. Previous visit:(2/6/2019)      Chief Complaint   Patient presents with    Tachycardia     Pt is here for a 3 wk f/u ablation. Pt denies chest pain, shortness of breath, dizziness, palpitations, and edema. Previous visit: (1/9/2018)      Chief Complaint   Patient presents with    Irregular Heart Beat     Gaetano patient here to follow up after cardioversion 12/26. Felt good post cardioversion for few days and then he started back having problems. Pt c/o SOB all the time but not new or worse than before. Pt denies CP, dizziness, palpitations or edema. Patient receiveing Xarelto samples and needs amio refill. Shortness of Breath           Previous visit (12/12/2018)      Chief Complaint   Patient presents with    Chest Pain      patient sent here from PCP visit this morning. Pt c/o chest pains, radiating into neck, shoulder and hip pain. Sob with activity, palpitations, migraines, dizziness and cold sweats. Episodes have happened at work while standing on his machine.   Symptoms are getting worse after experiencing them for the past few months. Pt denies edema. Shortness of Breath    Palpitations    Dizziness           Previous visit:2/10/2016)      Chief Complaint   Patient presents with    6 Month Follow-Up     Patient is here for 6 month OV, he denies any chest pain, palpitations, dizziness, SOB or edema. Pt wants to discuss some of his medications with you. Over all he feels lot better  He reports arthralgia at times - question of medication induced  Metoprolol makes him tired for some time after he takes it. Previous visit: (7/10/2015)      Chief Complaint   Patient presents with    Check-Up     2 month follow up. Denies dizziness, edema, and palpitations. He hasn't felt like he has been in A-Fib since having the ablation. Had great energies and was able to work with out any problem till he has come down with this URI       Chest Congestion     Patient states that for the last 3 weeks he has been feeling a congestion in his chest. He says it feels like he might be getting pneumonia. When he lays down it feels like there is a weight on his chest. He says he also feels achy in his joints. He was too scared to take anything that might cause him to go back into A-Fib. Sputum - yellowish      Shortness of Breath     Patient states that he feels short of breath with stairs only this started with URI symptoms. Denies chest pain,  palpitations, syncope or presyncope, edema        Previous visit: 2/20/2015      Chief Complaint   Patient presents with    Fatigue     Patient had cardioversion on 01/23/15. He states he did okay until last 2 days he started feeling tired, he went to PCP who did EKG and told him that he was having flutter. Patient states that he has been having some chest pain and sweating when his heart rate is fast. After taking digoxin he felt better      called me from his clinic.  I recommended to start on low dose digoxin as patient did not want to go to hospital for rapid ventricular rate.  I arranged an appointment today so that I can assess patient. Patient feels okay now but tired. Has low energy and after work feels very drained. Previous visit:    Chief Complaint   Patient presents with    Fatigue     Patient here for 3 week follow up with EKG. Patient states that he feels tired all of the time. He states that he had labs with PCP on 01/16/15 and again on yesterday. Patient is here to discuss possible cardioversion. Tired all the time    Patient had an LA clot when we performed CARMENZA since then on anticoagulation - 4 weeks    Chest tightness off and on - with palpitations  Shortness of breath Yes with palpitations  Palpitations Yes twice an hour  Syncope or Presyncope No  Edema No  Claudication No        Past Medical History:   Diagnosis Date    Arrhythmia 11/2014    Atrial fib    Arthritis     right shoulder    Atrial fibrillation ([facility])     CAD (coronary artery disease)     H/O cardiovascular stress test 12/12/2014    cardiolite-normal, no ischemia    H/O echocardiogram 4/13/15    EF 50-55% Mildly dilated left atrium. Borderline sized right ventricle. Trace MR & TR. History of echocardiogram 03/04/2020    EF 55-60%, Left atrium mildly dilated, no pericardial effusion, no significant valvular disease or diastolic dysfunction       Past Surgical History:   Procedure Laterality Date    ACHILLES TENDON SURGERY  2005    repair torn Jcarlos's tendon    BACK SURGERY      CARDIOVERSION  11/17/14    HERNIA REPAIR      LAPAROSCOPIC APPENDECTOMY N/A 11/28/2020    APPENDECTOMY LAPAROSCOPIC performed by Conrado Cunningham MD at [address] [facility]  3/10/15    cardiac ablation for a fib/ a flutter    SHOULDER SURGERY  2010    right shoulder rotator cuff surgery       Family History   Problem Relation Age of Onset    Cancer Father     Diabetes Father         reports that he has never smoked. His smokeless tobacco use includes chew. He reports current alcohol use.  He reports that he does not use drugs. Allergies   Allergen Reactions    Codeine Nausea Only    Pcn [Penicillins] Rash    Sulfa Antibiotics Rash       Current Outpatient Medications   Medication Sig Dispense Refill    metoprolol succinate (TOPROL XL) 25 MG extended release tablet Take 1 tablet by mouth in the morning. 30 tablet 3    dofetilide (TIKOSYN) 250 MCG capsule Take 1 capsule by mouth in the morning and 1 capsule before bedtime. 60 capsule 3    ibuprofen (ADVIL;MOTRIN) 800 MG tablet Take 1 tablet by mouth every 8 hours as needed for Pain 90 tablet 1    Cholecalciferol (VITAMIN D3 PO) Take 1 tablet by mouth daily      ASPIRIN 81 PO Take 81 mg by mouth daily      Multiple Vitamins-Minerals (MULTIVITAMIN ADULT PO) Take by mouth      Lactobacillus (PROBIOTIC ACIDOPHILUS PO) Take by mouth (Patient not taking: Reported on 9/14/2022)       No current facility-administered medications for this visit. Facility-Administered Medications Ordered in Other Visits   Medication Dose Route Frequency Provider Last Rate Last Admin    midazolam (VERSED) injection 2 mg  2 mg IntraVENous Once Alvarado Rendon MD           Review of Systems:   Review of Systems   Constitutional:  Tired and weak. Negative for fever, chills and activity change. HENT: Negative for congestion, ear pain and tinnitus. Eyes: Negative for photophobia, pain and visual disturbance. Respiratory:  shortness of breath Negative for cough, chest tightness and wheezing. Cardiovascular:  chest pain and palpitations Negative for leg swelling. Gastrointestinal: Negative for nausea, vomiting, abdominal pain, diarrhea, constipation and blood in stool. Endocrine: Negative for cold intolerance and heat intolerance. Genitourinary: Negative for dysuria, hematuria and flank pain. Musculoskeletal: Negative for myalgias, and neck stiffness. Arthralgias  Skin: Negative for color change and rash. Allergic/Immunologic: Negative for food allergies. Neurological:  Negative for dizziness, light-headedness, numbness and headaches. Hematological: Does not bruise/bleed easily. Psychiatric/Behavioral: Negative for behavioral problems, confusion and agitation. Physical Examination:    /72 (Site: Left Upper Arm, Position: Sitting, Cuff Size: Medium Adult)   Pulse 77   Ht 5' 10\" (1.778 m)   Wt 233 lb 12.8 oz (106.1 kg)   SpO2 98%   BMI 33.55 kg/m²    Wt Readings from Last 3 Encounters:   09/14/22 233 lb 12.8 oz (106.1 kg)   03/16/22 225 lb 3.2 oz (102.2 kg)   09/08/21 225 lb 12.8 oz (102.4 kg)     Body mass index is 33.55 kg/m². Physical Exam   Constitutional: He is oriented to person, place, and time and well-developed, well-nourished, and in no distress. HENT:   Head: Normocephalic and atraumatic. Eyes: Conjunctivae and EOM are normal. Pupils are equal, round, and reactive to light. Right eye exhibits no discharge. Neck: Normal range of motion. No JVD present. No thyromegaly present. Cardiovascular: Regular rhythm and  tachycardic. Exam reveals no friction rub. No murmur heard. Pulmonary/Chest: Effort normal and breath sounds normal. No stridor. No respiratory distress. He has no wheezes. Abdominal: Soft. Bowel sounds are normal. He exhibits no distension. There is no tenderness. Musculoskeletal: Normal range of motion. He exhibits no edema or tenderness. Neurological: He is alert and oriented to person, place, and time. He displays normal reflexes. No cranial nerve deficit. Coordination normal.   Skin: Skin is warm and dry. No rash noted. No erythema.    Psychiatric: Mood and affect normal.           CBC:   Lab Results   Component Value Date/Time    WBC 11.1 11/27/2020 07:20 PM    HGB 14.6 11/27/2020 07:20 PM    HCT 42.8 11/27/2020 07:20 PM     11/27/2020 07:20 PM     Lipids:   Lab Results   Component Value Date    CHOL 151 07/12/2018     PT/INR:   No components found for: PT,  INR     BMP:    Lab Results Component Value Date     11/27/2020    K 3.7 11/27/2020     11/27/2020    CO2 24 11/27/2020    BUN 15 11/27/2020     CMP:   Lab Results   Component Value Date    AST 19 11/27/2020    PROT 7.0 11/27/2020    BILITOT 0.5 11/27/2020    ALKPHOS 72 11/27/2020     TSH:    No results found for: TSH    EKGINTERPRETATION - EKG Interpretation:  sinus rhythm      IMPRESSION / RECOMMENDATIONS:     Paroxysmal atrial tachycardia  Atrial fibrillation sp ablation  Atrial flutter sp ablation  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 Patient denies any symptoms now. No need to repeat another echo until her symptoms. Patient is only on aspirin as he does not have any other risk factors. Given the recurrence of atrial tachycardia he is on Tikosyn and is tolerating well.   QTC is within normal limits    Continue metoprolol XL    FU 6 months      Sharon Estrella MD

## 2022-12-04 DIAGNOSIS — I47.1 ATRIAL TACHYCARDIA, PAROXYSMAL (HCC): ICD-10-CM

## 2022-12-04 DIAGNOSIS — Z98.890 S/P ABLATION OF ATRIAL FIBRILLATION: ICD-10-CM

## 2022-12-04 DIAGNOSIS — Z86.79 S/P ABLATION OF ATRIAL FIBRILLATION: ICD-10-CM

## 2022-12-05 RX ORDER — DOFETILIDE 0.25 MG/1
CAPSULE ORAL
Qty: 60 CAPSULE | Refills: 3 | Status: SHIPPED | OUTPATIENT
Start: 2022-12-05

## 2023-01-07 DIAGNOSIS — Z98.890 S/P ABLATION OF ATRIAL FIBRILLATION: ICD-10-CM

## 2023-01-07 DIAGNOSIS — I47.1 ATRIAL TACHYCARDIA, PAROXYSMAL (HCC): ICD-10-CM

## 2023-01-07 DIAGNOSIS — Z86.79 S/P ABLATION OF ATRIAL FIBRILLATION: ICD-10-CM

## 2023-01-09 RX ORDER — METOPROLOL SUCCINATE 25 MG/1
TABLET, EXTENDED RELEASE ORAL
Qty: 90 TABLET | Refills: 1 | Status: SHIPPED | OUTPATIENT
Start: 2023-01-09

## 2023-03-15 ENCOUNTER — OFFICE VISIT (OUTPATIENT)
Dept: CARDIOLOGY CLINIC | Age: 53
End: 2023-03-15
Payer: COMMERCIAL

## 2023-03-15 VITALS
SYSTOLIC BLOOD PRESSURE: 122 MMHG | DIASTOLIC BLOOD PRESSURE: 70 MMHG | HEIGHT: 70 IN | WEIGHT: 235.2 LBS | OXYGEN SATURATION: 98 % | HEART RATE: 82 BPM | BODY MASS INDEX: 33.67 KG/M2

## 2023-03-15 DIAGNOSIS — I48.0 PAF (PAROXYSMAL ATRIAL FIBRILLATION) (HCC): Primary | ICD-10-CM

## 2023-03-15 PROCEDURE — 93000 ELECTROCARDIOGRAM COMPLETE: CPT | Performed by: INTERNAL MEDICINE

## 2023-03-15 PROCEDURE — 3078F DIAST BP <80 MM HG: CPT | Performed by: INTERNAL MEDICINE

## 2023-03-15 PROCEDURE — 3074F SYST BP LT 130 MM HG: CPT | Performed by: INTERNAL MEDICINE

## 2023-03-15 PROCEDURE — 99212 OFFICE O/P EST SF 10 MIN: CPT | Performed by: INTERNAL MEDICINE

## 2023-03-15 NOTE — PROGRESS NOTES
Electrophysiology FU Note      Chief complaint :  Follow up for atrial fibrillation    Referring physician:        Primary care physician: Waldo Mancini DO      History of Present Illness: This visit (3/15/2023)      Chief Complaint   Patient presents with    6 Month Follow-Up     Pt has no cardiac symptoms      Denies chest pain, shortness of breath, palpitations, syncope or presyncope, edema         Previous visit:  (9/14/2022)      Chief Complaint   Patient presents with    Follow-up     Patient is here for a 6 month follow up. Patient states he is not having any cardaic issues at this time. Previous visit: (2/19/2020)      Chief Complaint   Patient presents with    6 Month Follow-Up     pt here for Follow Up. pt denies Chest Pain, Palpitaions,SOB, Dizziness, Edma. Pt drinks alcohol and caffeine occasionally. Previous visit:(5/23/2019)      Chief Complaint   Patient presents with    Atrial Fibrillation     OV for 3 month check on afib. Denies chest pain, shortness of breath. Denies palpitations  Feels over all okay - some tiredness he reports. Previous visit:(2/6/2019)      Chief Complaint   Patient presents with    Tachycardia     Pt is here for a 3 wk f/u ablation. Pt denies chest pain, shortness of breath, dizziness, palpitations, and edema. Previous visit: (1/9/2018)      Chief Complaint   Patient presents with    Irregular Heart Beat     [facility] patient here to follow up after cardioversion 12/26. Felt good post cardioversion for few days and then he started back having problems. Pt c/o SOB all the time but not new or worse than before. Pt denies CP, dizziness, palpitations or edema. Patient receiveing Xarelto samples and needs amio refill. Shortness of Breath           Previous visit (12/12/2018)      Chief Complaint   Patient presents with    Chest Pain      patient sent here from PCP visit this morning.   Pt c/o chest pains, radiating into neck, shoulder and hip pain. Sob with activity, palpitations, migraines, dizziness and cold sweats. Episodes have happened at work while standing on his machine. Symptoms are getting worse after experiencing them for the past few months. Pt denies edema. Shortness of Breath    Palpitations    Dizziness           Previous visit:2/10/2016)      Chief Complaint   Patient presents with    6 Month Follow-Up     Patient is here for 6 month OV, he denies any chest pain, palpitations, dizziness, SOB or edema. Pt wants to discuss some of his medications with you. Over all he feels lot better  He reports arthralgia at times - question of medication induced  Metoprolol makes him tired for some time after he takes it. Previous visit: (7/10/2015)      Chief Complaint   Patient presents with    Check-Up     2 month follow up. Denies dizziness, edema, and palpitations. He hasn't felt like he has been in A-Fib since having the ablation. Had great energies and was able to work with out any problem till he has come down with this URI       Chest Congestion     Patient states that for the last 3 weeks he has been feeling a congestion in his chest. He says it feels like he might be getting pneumonia. When he lays down it feels like there is a weight on his chest. He says he also feels achy in his joints. He was too scared to take anything that might cause him to go back into A-Fib. Sputum - yellowish      Shortness of Breath     Patient states that he feels short of breath with stairs only this started with URI symptoms. Denies chest pain,  palpitations, syncope or presyncope, edema        Previous visit: 2/20/2015      Chief Complaint   Patient presents with    Fatigue     Patient had cardioversion on 01/23/15. He states he did okay until last 2 days he started feeling tired, he went to PCP who did EKG and told him that he was having flutter.  Patient states that he has been having some chest pain and sweating when his heart rate is fast. After taking digoxin he felt better      called me from his clinic. I recommended to start on low dose digoxin as patient did not want to go to hospital for rapid ventricular rate. I arranged an appointment today so that I can assess patient. Patient feels okay now but tired. Has low energy and after work feels very drained. Previous visit:    Chief Complaint   Patient presents with    Fatigue     Patient here for 3 week follow up with EKG. Patient states that he feels tired all of the time. He states that he had labs with PCP on 01/16/15 and again on yesterday. Patient is here to discuss possible cardioversion. Tired all the time    Patient had an LA clot when we performed CARMENZA since then on anticoagulation - 4 weeks    Chest tightness off and on - with palpitations  Shortness of breath Yes with palpitations  Palpitations Yes twice an hour  Syncope or Presyncope No  Edema No  Claudication No        Past Medical History:   Diagnosis Date    Arrhythmia 11/2014    Atrial fib    Arthritis     right shoulder    Atrial fibrillation (HCC)     CAD (coronary artery disease)     H/O cardiovascular stress test 12/12/2014    cardiolite-normal, no ischemia    H/O echocardiogram 4/13/15    EF 50-55% Mildly dilated left atrium. Borderline sized right ventricle. Trace MR & TR.     History of echocardiogram 03/04/2020    EF 55-60%, Left atrium mildly dilated, no pericardial effusion, no significant valvular disease or diastolic dysfunction       Past Surgical History:   Procedure Laterality Date    ACHILLES TENDON SURGERY  2005    repair torn Jcarlos's tendon    BACK SURGERY      CARDIOVERSION  11/17/14    HERNIA REPAIR      LAPAROSCOPIC APPENDECTOMY N/A 11/28/2020    APPENDECTOMY LAPAROSCOPIC performed by Amalia Holman MD at [facility]  3/10/15    cardiac ablation for a fib/ a flutter    SHOULDER SURGERY  2010    right shoulder rotator cuff surgery       Family History   Problem Relation Age of Onset    Cancer Father     Diabetes Father         reports that he has never smoked. His smokeless tobacco use includes chew. He reports current alcohol use. He reports that he does not use drugs. Allergies   Allergen Reactions    Codeine Nausea Only    Pcn [Penicillins] Rash    Sulfa Antibiotics Rash       Current Outpatient Medications   Medication Sig Dispense Refill    metoprolol succinate (TOPROL XL) 25 MG extended release tablet TAKE ONE TABLET BY MOUTH EVERY MORNING 90 tablet 1    dofetilide (TIKOSYN) 250 MCG capsule TAKE ONE CAPSULE BY MOUTH EVERY MORNING AND TAKE ONE CAPSULE BY MOUTH EVERY NIGHT BEFORE BEDTIME 60 capsule 3    ibuprofen (ADVIL;MOTRIN) 800 MG tablet Take 1 tablet by mouth every 8 hours as needed for Pain 90 tablet 1    Cholecalciferol (VITAMIN D3 PO) Take 1 tablet by mouth daily      ASPIRIN 81 PO Take 81 mg by mouth daily      Multiple Vitamins-Minerals (MULTIVITAMIN ADULT PO) Take by mouth      Lactobacillus (PROBIOTIC ACIDOPHILUS PO) Take by mouth (Patient not taking: Reported on 9/14/2022)       No current facility-administered medications for this visit. Facility-Administered Medications Ordered in Other Visits   Medication Dose Route Frequency Provider Last Rate Last Admin    midazolam (VERSED) injection 2 mg  2 mg IntraVENous Once Leon Bowen MD           Review of Systems:   Review of Systems   Constitutional:  Tired and weak. Negative for fever, chills and activity change. HENT: Negative for congestion, ear pain and tinnitus. Eyes: Negative for photophobia, pain and visual disturbance. Respiratory:  shortness of breath Negative for cough, chest tightness and wheezing. Cardiovascular:  chest pain and palpitations Negative for leg swelling. Gastrointestinal: Negative for nausea, vomiting, abdominal pain, diarrhea, constipation and blood in stool. Endocrine: Negative for cold intolerance and heat intolerance. Genitourinary: Negative for dysuria, hematuria and flank pain. Musculoskeletal: Negative for myalgias, and neck stiffness. Arthralgias  Skin: Negative for color change and rash. Allergic/Immunologic: Negative for food allergies. Neurological:  Negative for dizziness, light-headedness, numbness and headaches. Hematological: Does not bruise/bleed easily. Psychiatric/Behavioral: Negative for behavioral problems, confusion and agitation. Physical Examination:    /70 (Site: Right Upper Arm, Position: Sitting, Cuff Size: Large Adult)   Pulse 82   Ht 5' 10\" (1.778 m)   Wt 235 lb 3.2 oz (106.7 kg)   SpO2 98%   BMI 33.75 kg/m²    Wt Readings from Last 3 Encounters:   03/15/23 235 lb 3.2 oz (106.7 kg)   09/14/22 233 lb 12.8 oz (106.1 kg)   03/16/22 225 lb 3.2 oz (102.2 kg)     Body mass index is 33.75 kg/m². Physical Exam   Constitutional: He is oriented to person, place, and time and well-developed, well-nourished, and in no distress. HENT:   Head: Normocephalic and atraumatic. Eyes: Conjunctivae and EOM are normal. Pupils are equal, round, and reactive to light. Right eye exhibits no discharge. Neck: Normal range of motion. No JVD present. No thyromegaly present. Cardiovascular: Regular rhythm and  tachycardic. Exam reveals no friction rub. No murmur heard. Pulmonary/Chest: Effort normal and breath sounds normal. No stridor. No respiratory distress. He has no wheezes. Abdominal: Soft. Bowel sounds are normal. He exhibits no distension. There is no tenderness. Musculoskeletal: Normal range of motion. He exhibits no edema or tenderness. Neurological: He is alert and oriented to person, place, and time. He displays normal reflexes. No cranial nerve deficit. Coordination normal.   Skin: Skin is warm and dry. No rash noted. No erythema.    Psychiatric: Mood and affect normal.           CBC:   Lab Results   Component Value Date/Time    WBC 11.1 11/27/2020 07:20 PM    HGB 14.6 11/27/2020 07:20 PM    HCT 42.8 11/27/2020 07:20 PM     11/27/2020 07:20 PM     Lipids:   Lab Results   Component Value Date    CHOL 151 07/12/2018     PT/INR:   No components found for: PT,  INR     BMP:    Lab Results   Component Value Date     11/27/2020    K 3.7 11/27/2020     11/27/2020    CO2 24 11/27/2020    BUN 15 11/27/2020     CMP:   Lab Results   Component Value Date    AST 19 11/27/2020    PROT 7.0 11/27/2020    BILITOT 0.5 11/27/2020    ALKPHOS 72 11/27/2020     TSH:    No results found for: TSH    EKGINTERPRETATION - EKG Interpretation:  sinus rhythm      IMPRESSION / RECOMMENDATIONS:     Paroxysmal atrial tachycardia  Atrial fibrillation sp ablation  Atrial flutter sp ablation  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 Patient denies any symptoms now. No need to repeat another echo until he has any symptoms. Patient is only on aspirin as he does not have any other risk factors. Given the recurrence of atrial tachycardia he is on Tikosyn and is tolerating well.   QTC is within normal limits    Continue metoprolol XL    FU 6 months      Mannie Salazar MD

## 2023-04-04 DIAGNOSIS — I47.1 ATRIAL TACHYCARDIA, PAROXYSMAL (HCC): ICD-10-CM

## 2023-04-04 DIAGNOSIS — Z86.79 S/P ABLATION OF ATRIAL FIBRILLATION: ICD-10-CM

## 2023-04-04 DIAGNOSIS — Z98.890 S/P ABLATION OF ATRIAL FIBRILLATION: ICD-10-CM

## 2023-04-04 RX ORDER — DOFETILIDE 0.25 MG/1
CAPSULE ORAL
Qty: 60 CAPSULE | Refills: 3 | Status: SHIPPED | OUTPATIENT
Start: 2023-04-04

## 2023-07-27 DIAGNOSIS — I47.1 ATRIAL TACHYCARDIA, PAROXYSMAL (HCC): ICD-10-CM

## 2023-07-27 DIAGNOSIS — Z86.79 S/P ABLATION OF ATRIAL FIBRILLATION: ICD-10-CM

## 2023-07-27 DIAGNOSIS — Z98.890 S/P ABLATION OF ATRIAL FIBRILLATION: ICD-10-CM

## 2023-07-27 RX ORDER — METOPROLOL SUCCINATE 25 MG/1
TABLET, EXTENDED RELEASE ORAL
Qty: 90 TABLET | Refills: 1 | Status: SHIPPED | OUTPATIENT
Start: 2023-07-27

## 2023-08-06 DIAGNOSIS — Z86.79 S/P ABLATION OF ATRIAL FIBRILLATION: ICD-10-CM

## 2023-08-06 DIAGNOSIS — I47.1 ATRIAL TACHYCARDIA, PAROXYSMAL (HCC): ICD-10-CM

## 2023-08-06 DIAGNOSIS — Z98.890 S/P ABLATION OF ATRIAL FIBRILLATION: ICD-10-CM

## 2023-08-08 RX ORDER — DOFETILIDE 0.25 MG/1
CAPSULE ORAL
Qty: 60 CAPSULE | Refills: 3 | Status: SHIPPED | OUTPATIENT
Start: 2023-08-08

## 2023-10-09 DIAGNOSIS — G44.221 CHRONIC TENSION-TYPE HEADACHE, INTRACTABLE: ICD-10-CM

## 2023-10-10 RX ORDER — IBUPROFEN 800 MG/1
800 TABLET ORAL EVERY 8 HOURS PRN
Qty: 90 TABLET | Refills: 1 | OUTPATIENT
Start: 2023-10-10

## 2023-12-08 DIAGNOSIS — I47.19 ATRIAL TACHYCARDIA, PAROXYSMAL: ICD-10-CM

## 2023-12-08 DIAGNOSIS — Z86.79 S/P ABLATION OF ATRIAL FIBRILLATION: ICD-10-CM

## 2023-12-08 DIAGNOSIS — Z98.890 S/P ABLATION OF ATRIAL FIBRILLATION: ICD-10-CM

## 2023-12-08 RX ORDER — DOFETILIDE 0.25 MG/1
CAPSULE ORAL
Qty: 60 CAPSULE | Refills: 5 | Status: SHIPPED | OUTPATIENT
Start: 2023-12-08

## 2024-01-03 ENCOUNTER — OFFICE VISIT (OUTPATIENT)
Dept: CARDIOLOGY CLINIC | Age: 54
End: 2024-01-03
Payer: COMMERCIAL

## 2024-01-03 VITALS
WEIGHT: 234 LBS | SYSTOLIC BLOOD PRESSURE: 124 MMHG | DIASTOLIC BLOOD PRESSURE: 78 MMHG | HEIGHT: 70 IN | BODY MASS INDEX: 33.5 KG/M2 | HEART RATE: 76 BPM

## 2024-01-03 DIAGNOSIS — Z86.79 S/P ABLATION OF ATRIAL FIBRILLATION: ICD-10-CM

## 2024-01-03 DIAGNOSIS — I48.0 PAF (PAROXYSMAL ATRIAL FIBRILLATION) (HCC): Primary | ICD-10-CM

## 2024-01-03 DIAGNOSIS — I47.19 ATRIAL TACHYCARDIA, PAROXYSMAL: ICD-10-CM

## 2024-01-03 DIAGNOSIS — G44.221 CHRONIC TENSION-TYPE HEADACHE, INTRACTABLE: ICD-10-CM

## 2024-01-03 DIAGNOSIS — Z98.890 S/P ABLATION OF ATRIAL FIBRILLATION: ICD-10-CM

## 2024-01-03 PROCEDURE — 3074F SYST BP LT 130 MM HG: CPT | Performed by: INTERNAL MEDICINE

## 2024-01-03 PROCEDURE — 93000 ELECTROCARDIOGRAM COMPLETE: CPT | Performed by: INTERNAL MEDICINE

## 2024-01-03 PROCEDURE — 99213 OFFICE O/P EST LOW 20 MIN: CPT | Performed by: INTERNAL MEDICINE

## 2024-01-03 PROCEDURE — 3078F DIAST BP <80 MM HG: CPT | Performed by: INTERNAL MEDICINE

## 2024-01-03 RX ORDER — METOPROLOL SUCCINATE 25 MG/1
25 TABLET, EXTENDED RELEASE ORAL EVERY MORNING
Qty: 90 TABLET | Refills: 3 | Status: SHIPPED | OUTPATIENT
Start: 2024-01-03

## 2024-01-03 RX ORDER — IBUPROFEN 800 MG/1
800 TABLET ORAL EVERY 8 HOURS PRN
Qty: 90 TABLET | Refills: 2 | Status: SHIPPED | OUTPATIENT
Start: 2024-01-03

## 2024-01-03 NOTE — PATIENT INSTRUCTIONS
Please be informed that if you contact our office outside of normal business hours the physician on call cannot help with any scheduling or rescheduling issues, procedure instruction questions or any type of medication issue.    We advise you for any urgent/emergency that you go to the nearest emergency room!    PLEASE CALL OUR OFFICE DURING NORMAL BUSINESS HOURS    Monday - Friday   8 am to 5 pm    [place]: [phone]    [place]: [phone]    [place]:  [phone]    **It is YOUR responsibilty to bring medication bottles and/or updated medication list to EACH APPOINTMENT. This will allow us to better serve you and all your healthcare needs**    Thank you for allowing us to care for you today!   We want to ensure we can follow your treatment plan and we strive to give you the best outcomes and experience possible.   If you ever have a life threatening emergency and call 911 - for an ambulance (EMS)   Our providers can only care for you at:   [facility] or [facility].   Even if you have someone take you or you drive yourself we can only care for you in a [facility] facility. Our providers are not setup at the other healthcare locations!

## 2024-01-03 NOTE — PROGRESS NOTES
Electrophysiology FU Note      Chief complaint :  6 month follow up    Referring physician:        Primary care physician: Rodney Martinez DO      History of Present Illness:     This visit (1/3/2024)      Chief Complaint   Patient presents with    6 Month Follow-Up     Pt denies cardiac symptoms  Denies chest pain, shortness of breath, palpitations, syncope or presyncope, edema            Previous visit:  (3/15/2023)      Chief Complaint   Patient presents with    6 Month Follow-Up     Pt has no cardiac symptoms      Denies chest pain, shortness of breath, palpitations, syncope or presyncope, edema         Previous visit:  (9/14/2022)      Chief Complaint   Patient presents with    Follow-up     Patient is here for a 6 month follow up. Patient states he is not having any cardaic issues at this time.         Previous visit: (2/19/2020)      Chief Complaint   Patient presents with    6 Month Follow-Up     pt here for Follow Up. pt denies Chest Pain, Palpitaions,SOB, Dizziness, Edma. Pt drinks alcohol and caffeine occasionally.            Previous visit:(5/23/2019)      Chief Complaint   Patient presents with    Atrial Fibrillation     OV for 3 month check on afib. Denies chest pain, shortness of breath. Denies palpitations  Feels over all okay - some tiredness he reports.            Previous visit:(2/6/2019)      Chief Complaint   Patient presents with    Tachycardia     Pt is here for a 3 wk f/u ablation. Pt denies chest pain, shortness of breath, dizziness, palpitations, and edema.           Previous visit: (1/9/2018)      Chief Complaint   Patient presents with    Irregular Heart Beat     [facility] patient here to follow up after cardioversion 12/26. Felt good post cardioversion for few days and then he started back having problems.  Pt c/o SOB all the time but not new or worse than before.  Pt denies CP, dizziness, palpitations or edema.  Patient receiveing Xarelto samples and needs amio refill.    Shortness

## 2024-01-26 DIAGNOSIS — G44.221 CHRONIC TENSION-TYPE HEADACHE, INTRACTABLE: ICD-10-CM

## 2024-01-26 DIAGNOSIS — I48.0 PAF (PAROXYSMAL ATRIAL FIBRILLATION) (HCC): ICD-10-CM

## 2024-01-26 RX ORDER — IBUPROFEN 800 MG/1
TABLET ORAL
Qty: 30 TABLET | OUTPATIENT
Start: 2024-01-26

## 2024-06-02 DIAGNOSIS — Z86.79 S/P ABLATION OF ATRIAL FIBRILLATION: ICD-10-CM

## 2024-06-02 DIAGNOSIS — I47.19 ATRIAL TACHYCARDIA, PAROXYSMAL (HCC): ICD-10-CM

## 2024-06-02 DIAGNOSIS — Z98.890 S/P ABLATION OF ATRIAL FIBRILLATION: ICD-10-CM

## 2024-06-03 RX ORDER — DOFETILIDE 0.25 MG/1
CAPSULE ORAL
Qty: 180 CAPSULE | Refills: 0 | Status: SHIPPED | OUTPATIENT
Start: 2024-06-03

## 2024-07-10 ENCOUNTER — OFFICE VISIT (OUTPATIENT)
Dept: CARDIOLOGY CLINIC | Age: 54
End: 2024-07-10
Payer: COMMERCIAL

## 2024-07-10 ENCOUNTER — HOSPITAL ENCOUNTER (OUTPATIENT)
Age: 54
Discharge: HOME | End: 2024-07-10
Payer: COMMERCIAL

## 2024-07-10 VITALS
OXYGEN SATURATION: 97 % | HEIGHT: 70 IN | BODY MASS INDEX: 33.36 KG/M2 | DIASTOLIC BLOOD PRESSURE: 70 MMHG | HEART RATE: 84 BPM | SYSTOLIC BLOOD PRESSURE: 120 MMHG | WEIGHT: 233 LBS

## 2024-07-10 DIAGNOSIS — Z98.890 S/P ABLATION OF ATRIAL FIBRILLATION: ICD-10-CM

## 2024-07-10 DIAGNOSIS — Z86.79 S/P ABLATION OF ATRIAL FIBRILLATION: ICD-10-CM

## 2024-07-10 DIAGNOSIS — Z51.81 VISIT FOR MONITORING TIKOSYN THERAPY: ICD-10-CM

## 2024-07-10 DIAGNOSIS — Z79.899 VISIT FOR MONITORING TIKOSYN THERAPY: ICD-10-CM

## 2024-07-10 DIAGNOSIS — I47.19 ATRIAL TACHYCARDIA, PAROXYSMAL (HCC): Primary | ICD-10-CM

## 2024-07-10 DIAGNOSIS — I48.0 PAF (PAROXYSMAL ATRIAL FIBRILLATION) (HCC): ICD-10-CM

## 2024-07-10 LAB
ANION GAP SERPL CALCULATED.3IONS-SCNC: 13 MMOL/L (ref 7–16)
BUN SERPL-MCNC: 23 MG/DL (ref 6–23)
CALCIUM SERPL-MCNC: 9.5 MG/DL (ref 8.3–10.6)
CHLORIDE BLD-SCNC: 106 MMOL/L (ref 99–110)
CO2: 23 MMOL/L (ref 21–32)
CREAT SERPL-MCNC: 1.2 MG/DL (ref 0.9–1.3)
GFR, ESTIMATED: 71 ML/MIN/1.73M2
GLUCOSE SERPL-MCNC: 103 MG/DL (ref 70–99)
MAGNESIUM: 2 MG/DL (ref 1.8–2.4)
POTASSIUM SERPL-SCNC: 4.7 MMOL/L (ref 3.5–5.1)
SODIUM BLD-SCNC: 142 MMOL/L (ref 135–145)

## 2024-07-10 PROCEDURE — 3078F DIAST BP <80 MM HG: CPT | Performed by: NURSE PRACTITIONER

## 2024-07-10 PROCEDURE — 80048 BASIC METABOLIC PNL TOTAL CA: CPT

## 2024-07-10 PROCEDURE — 83735 ASSAY OF MAGNESIUM: CPT

## 2024-07-10 PROCEDURE — 3074F SYST BP LT 130 MM HG: CPT | Performed by: NURSE PRACTITIONER

## 2024-07-10 PROCEDURE — 36415 COLL VENOUS BLD VENIPUNCTURE: CPT

## 2024-07-10 PROCEDURE — 93000 ELECTROCARDIOGRAM COMPLETE: CPT | Performed by: NURSE PRACTITIONER

## 2024-07-10 PROCEDURE — 99214 OFFICE O/P EST MOD 30 MIN: CPT | Performed by: NURSE PRACTITIONER

## 2024-07-10 RX ORDER — METOPROLOL SUCCINATE 25 MG/1
25 TABLET, EXTENDED RELEASE ORAL EVERY MORNING
Qty: 90 TABLET | Refills: 3 | Status: SHIPPED | OUTPATIENT
Start: 2024-07-10

## 2024-07-10 NOTE — PROGRESS NOTES
distension. There is no tenderness.   Musculoskeletal: Normal range of motion. He exhibits no edema or tenderness.   Neurological: He is alert and oriented to person, place, and time. He displays normal reflexes. No cranial nerve deficit. Coordination normal.   Skin: Skin is warm and dry. No rash noted. No erythema.   Psychiatric: Mood and affect normal.           CBC:   Lab Results   Component Value Date/Time    WBC 11.1 11/27/2020 07:20 PM    HGB 14.6 11/27/2020 07:20 PM    HCT 42.8 11/27/2020 07:20 PM     11/27/2020 07:20 PM     Lipids:   Lab Results   Component Value Date    CHOL 151 07/12/2018    TRIG 97 07/12/2018    HDL 35 (L) 07/12/2018    LDLDIRECT 113 (H) 07/12/2018     PT/INR:   Lab Results   Component Value Date/Time    INR 1.80 05/23/2019 12:20 PM        BMP:    Lab Results   Component Value Date     11/27/2020    K 3.7 11/27/2020     11/27/2020    CO2 24 11/27/2020    BUN 15 11/27/2020     CMP:   Lab Results   Component Value Date    AST 19 11/27/2020    ALT 37 11/27/2020    BILITOT 0.5 11/27/2020    ALKPHOS 72 11/27/2020     TSH:    No results found for: \"TSH\", \"T4\"    EKGINTERPRETATION - EKG Interpretation:  sinus rhythm      IMPRESSION / RECOMMENDATIONS:     Paroxysmal atrial tachycardia  Atrial fibrillation sp ablation  Atrial flutter sp ablation  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  We will continue aspirin 81 mg daily    Patient to follow-up in 6 months or sooner if needed      Eryn Krueger, MATTIE - CNP

## 2024-08-29 DIAGNOSIS — I47.19 ATRIAL TACHYCARDIA, PAROXYSMAL (HCC): ICD-10-CM

## 2024-08-29 DIAGNOSIS — Z86.79 S/P ABLATION OF ATRIAL FIBRILLATION: ICD-10-CM

## 2024-08-29 DIAGNOSIS — Z98.890 S/P ABLATION OF ATRIAL FIBRILLATION: ICD-10-CM

## 2024-08-29 RX ORDER — DOFETILIDE 0.25 MG/1
CAPSULE ORAL
Qty: 180 CAPSULE | Refills: 1 | Status: SHIPPED | OUTPATIENT
Start: 2024-08-29

## 2024-09-03 DIAGNOSIS — G44.221 CHRONIC TENSION-TYPE HEADACHE, INTRACTABLE: ICD-10-CM

## 2024-09-03 DIAGNOSIS — I48.0 PAF (PAROXYSMAL ATRIAL FIBRILLATION) (HCC): ICD-10-CM

## 2024-09-05 RX ORDER — IBUPROFEN 800 MG/1
800 TABLET, FILM COATED ORAL EVERY 8 HOURS PRN
Qty: 90 TABLET | Refills: 2 | Status: SHIPPED | OUTPATIENT
Start: 2024-09-05

## (undated) DEVICE — ELECTRODE ES AD CRDLSS PT RET REM POLYHESIVE

## (undated) DEVICE — Z INACTIVE USE 2660664 SOLUTION IRRIG 3000ML 0.9% SOD CHL USP UROMATIC PLAS CONT

## (undated) DEVICE — TUBING, SUCTION, 9/32" X 10', STRAIGHT: Brand: MEDLINE

## (undated) DEVICE — TOTAL TRAY, DB, 100% SILI FOLEY, 16FR 10: Brand: MEDLINE

## (undated) DEVICE — SUTURE VCRL SZ 5-0 L18IN ABSRB UD L13MM P-3 3/8 CIR PRIM J493G

## (undated) DEVICE — GLOVE SURG SZ 65 CRM LTX FREE POLYISOPRENE POLYMER BEAD ANTI

## (undated) DEVICE — SET TBNG DISP TIP FOR AHTO

## (undated) DEVICE — TROCAR ENDOSCP L100MM DIA12MM DIL TIP STBL SL ENDOPATH XCEL

## (undated) DEVICE — BLADE CLIPPER GEN PURP NS